# Patient Record
Sex: FEMALE | Race: WHITE | Employment: FULL TIME | URBAN - METROPOLITAN AREA
[De-identification: names, ages, dates, MRNs, and addresses within clinical notes are randomized per-mention and may not be internally consistent; named-entity substitution may affect disease eponyms.]

---

## 2023-03-16 DIAGNOSIS — K21.9 GASTRO-ESOPHAGEAL REFLUX DISEASE WITHOUT ESOPHAGITIS: ICD-10-CM

## 2023-03-16 RX ORDER — PANTOPRAZOLE SODIUM 40 MG/1
TABLET, DELAYED RELEASE ORAL
Qty: 90 TABLET | Refills: 1 | Status: SHIPPED | OUTPATIENT
Start: 2023-03-16 | End: 2023-10-04

## 2023-04-01 PROBLEM — K21.9 GASTROESOPHAGEAL REFLUX DISEASE WITHOUT ESOPHAGITIS: Status: ACTIVE | Noted: 2023-04-01

## 2023-04-01 PROBLEM — K63.5 COLON POLYP: Status: ACTIVE | Noted: 2023-04-01

## 2023-04-01 PROBLEM — I10 PRIMARY HYPERTENSION: Status: ACTIVE | Noted: 2023-04-01

## 2023-04-01 PROBLEM — E78.00 PURE HYPERCHOLESTEROLEMIA: Status: ACTIVE | Noted: 2023-04-01

## 2023-04-01 PROBLEM — E55.9 VITAMIN D DEFICIENCY: Status: ACTIVE | Noted: 2023-04-01

## 2023-04-01 PROBLEM — K76.0 FATTY LIVER: Status: ACTIVE | Noted: 2023-04-01

## 2023-04-01 PROBLEM — C44.91 BCC (BASAL CELL CARCINOMA OF SKIN): Status: ACTIVE | Noted: 2023-04-01

## 2023-04-01 PROBLEM — D64.9 ANEMIA, UNSPECIFIED: Status: ACTIVE | Noted: 2023-04-01

## 2023-04-01 PROBLEM — R51.9 CHRONIC HEADACHE: Status: ACTIVE | Noted: 2023-04-01

## 2023-04-01 PROBLEM — G89.29 CHRONIC HEADACHE: Status: ACTIVE | Noted: 2023-04-01

## 2023-04-03 PROBLEM — K21.9 ACID REFLUX: Status: ACTIVE | Noted: 2023-04-03

## 2023-04-03 PROBLEM — E78.5 DYSLIPIDEMIA: Status: ACTIVE | Noted: 2023-04-03

## 2023-04-03 PROBLEM — K82.4 GALLBLADDER POLYP: Status: ACTIVE | Noted: 2023-04-03

## 2023-04-03 PROBLEM — I10 ESSENTIAL HYPERTENSION: Status: ACTIVE | Noted: 2023-04-03

## 2023-04-03 PROBLEM — E83.52 HYPERCALCEMIA: Status: ACTIVE | Noted: 2023-04-03

## 2023-04-03 PROBLEM — R74.8 ELEVATED LIVER ENZYMES: Status: ACTIVE | Noted: 2023-04-03

## 2023-04-18 ASSESSMENT — ENCOUNTER SYMPTOMS
WHEEZING: 0
SHORTNESS OF BREATH: 0
CONSTITUTIONAL NEGATIVE: 1

## 2023-04-18 NOTE — PROGRESS NOTES
Subjective   Patient ID: Thu Antunez is a 43 y.o. female who presents for Follow-up (Follow-up for Bp readings and medication /Fasting-Yes/Mammo Have not yet/Bp's Yes 127/89 this morning).  Last physical: 10/4/22  Did pt do mammo? no    Is pt fasting? yes  BPs at home-120s/80s in afternoon      HPI  Last labs-10/2022 tsh nl, vit d nl, ldl 142, 2 liver enz high; 4/2022 cbc nl  Due for labs- cmp, lipid    Cholesterol   Date Value Ref Range Status   10/04/2022 256 (H) 0 - 199 mg/dL Final     Comment:     .      AGE      DESIRABLE   BORDERLINE HIGH   HIGH     0-19 Y     0 - 169       170 - 199     >/= 200    20-24 Y     0 - 189       190 - 224     >/= 225         >24 Y     0 - 199       200 - 239     >/= 240   **All ranges are based on fasting samples. Specific   therapeutic targets will vary based on patient-specific   cardiac risk.  .   Pediatric guidelines reference:Pediatrics 2011, 128(S5).   Adult guidelines reference: NCEP ATPIII Guidelines,     RENATA 2001, 258:2486-97  .   Venipuncture immediately after or during the    administration of Metamizole may lead to falsely   low results. Testing should be performed immediately   prior to Metamizole dosing.     04/05/2022 255 (H) 0 - 199 mg/dL Final     Comment:     .      AGE      DESIRABLE   BORDERLINE HIGH   HIGH     0-19 Y     0 - 169       170 - 199     >/= 200    20-24 Y     0 - 189       190 - 224     >/= 225         >24 Y     0 - 199       200 - 239     >/= 240   **All ranges are based on fasting samples. Specific   therapeutic targets will vary based on patient-specific   cardiac risk.  .   Pediatric guidelines reference:Pediatrics 2011, 128(S5).   Adult guidelines reference: NCEP ATPIII Guidelines,     RENATA 2001, 258:2486-97  .   Venipuncture immediately after or during the    administration of Metamizole may lead to falsely   low results. Testing should be performed immediately   prior to Metamizole dosing.     02/18/2020 235 (H) 0 - 199 mg/dL Final      Comment:     .      AGE      DESIRABLE   BORDERLINE HIGH   HIGH     0-19 Y     0 - 169       170 - 199     >/= 200    20-24 Y     0 - 189       190 - 224     >/= 225         >24 Y     0 - 199       200 - 239     >/= 240   **All ranges are based on fasting samples. Specific   therapeutic targets will vary based on patient-specific   cardiac risk.  .   Pediatric guidelines reference:Pediatrics 2011, 128(S5).   Adult guidelines reference: NCEP ATPIII Guidelines,     RENATA 2001, 258:2486-97  .   Venipuncture immediately after or during the    administration of Metamizole may lead to falsely   low results. Testing should be performed immediately   prior to Metamizole dosing.       Triglycerides   Date Value Ref Range Status   10/04/2022 140 0 - 149 mg/dL Final     Comment:     .      AGE      DESIRABLE   BORDERLINE HIGH   HIGH     VERY HIGH   0 D-90 D    19 - 174         ----         ----        ----  91 D- 9 Y     0 -  74        75 -  99     >/= 100      ----    10-19 Y     0 -  89        90 - 129     >/= 130      ----    20-24 Y     0 - 114       115 - 149     >/= 150      ----         >24 Y     0 - 149       150 - 199    200- 499    >/= 500  .   Venipuncture immediately after or during the    administration of Metamizole may lead to falsely   low results. Testing should be performed immediately   prior to Metamizole dosing.     04/05/2022 112 0 - 149 mg/dL Final     Comment:     .      AGE      DESIRABLE   BORDERLINE HIGH   HIGH     VERY HIGH   0 D-90 D    19 - 174         ----         ----        ----  91 D- 9 Y     0 -  74        75 -  99     >/= 100      ----    10-19 Y     0 -  89        90 - 129     >/= 130      ----    20-24 Y     0 - 114       115 - 149     >/= 150      ----         >24 Y     0 - 149       150 - 199    200- 499    >/= 500  .   Venipuncture immediately after or during the    administration of Metamizole may lead to falsely   low results. Testing should be performed immediately   prior to  Metamizole dosing.     02/18/2020 136 0 - 149 mg/dL Final     Comment:     .      AGE      DESIRABLE   BORDERLINE HIGH   HIGH     VERY HIGH   0 D-90 D    19 - 174         ----         ----        ----  91 D- 9 Y     0 -  74        75 -  99     >/= 100      ----    10-19 Y     0 -  89        90 - 129     >/= 130      ----    20-24 Y     0 - 114       115 - 149     >/= 150      ----         >24 Y     0 - 149       150 - 199    200- 499    >/= 500  .   Venipuncture immediately after or during the    administration of Metamizole may lead to falsely   low results. Testing should be performed immediately   prior to Metamizole dosing.       HDL   Date Value Ref Range Status   10/04/2022 86.0 mg/dL Final     Comment:     .      AGE      VERY LOW   LOW     NORMAL    HIGH       0-19 Y       < 35   < 40     40-45     ----    20-24 Y       ----   < 40       >45     ----      >24 Y       ----   < 40     40-60      >60  .     04/05/2022 103.4 mg/dL Final     Comment:     .      AGE      VERY LOW   LOW     NORMAL    HIGH       0-19 Y       < 35   < 40     40-45     ----    20-24 Y       ----   < 40       >45     ----      >24 Y       ----   < 40     40-60      >60  .     02/18/2020 111.1 mg/dL Final     Comment:     .      AGE      VERY LOW   LOW     NORMAL    HIGH       0-19 Y       < 35   < 40     40-45     ----    20-24 Y       ----   < 40       >45     ----      >24 Y       ----   < 40     40-60      >60  .       No results found for: LDL  TSH   Date Value Ref Range Status   10/04/2022 1.63 0.44 - 3.98 mIU/L Final     Comment:      TSH testing is performed using different testing    methodology at [facility] than at other    [facility] hospitals. Direct result comparisons should    only be made within the same method.       No components found for: A1C  No components found for: POCA1C  No results found for: ALBUR  No components found for: POCALBUR    Exercise-back pain; walking and mild yoga 3 times a  wk  Diet-breakfast-scr eggs or grilled ch with quinoa and chickpeas; le croix  Lunch-grilled ch with quinoa and chickpeas or lean cuisine, turkey sandwich, le croix  Dinner-protein, starch, veg, le croix  Snacks string cheese occas  Etoh not daily-socially    Immunization History   Administered Date(s) Administered    Pfizer Purple Cap SARS-CoV-2 04/20/2021, 05/18/2021        No care team member to display     Review of Systems   Constitutional: Negative.    Respiratory:  Negative for shortness of breath and wheezing.    Cardiovascular:  Negative for chest pain.       Objective   Visit Vitals  BP (!) 131/93   Pulse 88   Temp 36.8 °C (98.2 °F) (Oral)      BP Readings from Last 3 Encounters:   04/19/23 (!) 131/93   10/04/22 122/84   04/05/22 118/88     Wt Readings from Last 3 Encounters:   04/19/23 75.5 kg (166 lb 6.4 oz)   10/04/22 72.6 kg (160 lb)   04/05/22 72.5 kg (159 lb 12.8 oz)       The 10-year ASCVD risk score (Debra CHRISTIANSEN, et al., 2019) is: 0.7%    Values used to calculate the score:      Age: 43 years      Sex: Female      Is Non- : No      Diabetic: No      Tobacco smoker: No      Systolic Blood Pressure: 131 mmHg      Is BP treated: Yes      HDL Cholesterol: 86 mg/dL      Total Cholesterol: 256 mg/dL     Physical Exam  Constitutional:       General: She is not in acute distress.     Appearance: Normal appearance.   Neurological:      Mental Status: She is alert.       Assessment/Plan   Diagnoses and all orders for this visit:  Pure hypercholesterolemia  -     Lipid Panel; Future  -     Comprehensive Metabolic Panel; Future  Primary hypertension  -     Comprehensive Metabolic Panel; Future  Encounter for screening mammogram for malignant neoplasm of breast  -     BI mammo bilateral screening tomosynthesis; Future

## 2023-04-19 ENCOUNTER — TELEPHONE (OUTPATIENT)
Dept: PRIMARY CARE | Facility: CLINIC | Age: 43
End: 2023-04-19

## 2023-04-19 ENCOUNTER — OFFICE VISIT (OUTPATIENT)
Dept: PRIMARY CARE | Facility: CLINIC | Age: 43
End: 2023-04-19
Payer: COMMERCIAL

## 2023-04-19 VITALS
SYSTOLIC BLOOD PRESSURE: 130 MMHG | OXYGEN SATURATION: 96 % | BODY MASS INDEX: 30.43 KG/M2 | DIASTOLIC BLOOD PRESSURE: 80 MMHG | HEART RATE: 88 BPM | WEIGHT: 166.4 LBS | TEMPERATURE: 98.2 F

## 2023-04-19 DIAGNOSIS — E78.00 PURE HYPERCHOLESTEROLEMIA: Primary | ICD-10-CM

## 2023-04-19 DIAGNOSIS — Z12.31 ENCOUNTER FOR SCREENING MAMMOGRAM FOR MALIGNANT NEOPLASM OF BREAST: ICD-10-CM

## 2023-04-19 DIAGNOSIS — I10 PRIMARY HYPERTENSION: ICD-10-CM

## 2023-04-19 DIAGNOSIS — R73.9 ELEVATED BLOOD SUGAR: Primary | ICD-10-CM

## 2023-04-19 PROBLEM — E78.5 DYSLIPIDEMIA: Status: RESOLVED | Noted: 2023-04-03 | Resolved: 2023-04-19

## 2023-04-19 LAB
NON-UH HIE A/G RATIO: 1.2
NON-UH HIE ALB: 4.1 G/DL (ref 3.4–5)
NON-UH HIE ALK PHOS: 76 UNIT/L (ref 46–116)
NON-UH HIE BILIRUBIN, TOTAL: 1.2 MG/DL (ref 0.2–1)
NON-UH HIE BUN/CREAT RATIO: 16.7
NON-UH HIE BUN: 15 MG/DL (ref 9–23)
NON-UH HIE CALCIUM: 10.3 MG/DL (ref 8.7–10.4)
NON-UH HIE CALCULATED LDL CHOLESTEROL: 134 MG/DL (ref 60–130)
NON-UH HIE CALCULATED OSMOLALITY: 277 MOSM/KG (ref 275–295)
NON-UH HIE CHLORIDE: 102 MMOL/L (ref 98–107)
NON-UH HIE CHOLESTEROL: 272 MG/DL (ref 100–200)
NON-UH HIE CO2, VENOUS: 28 MMOL/L (ref 20–31)
NON-UH HIE CREATININE: 0.9 MG/DL (ref 0.5–0.8)
NON-UH HIE GFR AA: >60
NON-UH HIE GLOBULIN: 3.3 G/DL
NON-UH HIE GLOMERULAR FILTRATION RATE: >60 ML/MIN/1.73M?
NON-UH HIE GLUCOSE: 103 MG/DL (ref 74–106)
NON-UH HIE GOT: 36 UNIT/L (ref 15–37)
NON-UH HIE GPT: 46 UNIT/L (ref 10–49)
NON-UH HIE HDL CHOLESTEROL: 86 MG/DL (ref 40–60)
NON-UH HIE K: 4.3 MMOL/L (ref 3.5–5.1)
NON-UH HIE NA: 138 MMOL/L (ref 135–145)
NON-UH HIE TOTAL CHOL/HDL CHOL RATIO: 3.2
NON-UH HIE TOTAL PROTEIN: 7.4 G/DL (ref 5.7–8.2)
NON-UH HIE TRIGLYCERIDES: 258 MG/DL (ref 30–150)

## 2023-04-19 PROCEDURE — 99214 OFFICE O/P EST MOD 30 MIN: CPT | Performed by: NURSE PRACTITIONER

## 2023-04-19 PROCEDURE — 1036F TOBACCO NON-USER: CPT | Performed by: NURSE PRACTITIONER

## 2023-04-19 PROCEDURE — 3075F SYST BP GE 130 - 139MM HG: CPT | Performed by: NURSE PRACTITIONER

## 2023-04-19 PROCEDURE — 3080F DIAST BP >= 90 MM HG: CPT | Performed by: NURSE PRACTITIONER

## 2023-04-19 PROCEDURE — 3079F DIAST BP 80-89 MM HG: CPT | Performed by: NURSE PRACTITIONER

## 2023-04-19 RX ORDER — GLUCOSAM/CHONDRO/HERB 149/HYAL 750-100 MG
1 TABLET ORAL 2 TIMES DAILY
Qty: 60 CAPSULE | Refills: 5
Start: 2023-04-19 | End: 2023-05-19

## 2023-04-19 ASSESSMENT — PATIENT HEALTH QUESTIONNAIRE - PHQ9
1. LITTLE INTEREST OR PLEASURE IN DOING THINGS: NOT AT ALL
SUM OF ALL RESPONSES TO PHQ9 QUESTIONS 1 AND 2: 0
2. FEELING DOWN, DEPRESSED OR HOPELESS: NOT AT ALL

## 2023-04-19 NOTE — TELEPHONE ENCOUNTER
----- Message from JEFERSON Jimenez-CNP sent at 4/19/2023  1:53 PM EDT -----  Hdl normal.  Ldl high at 134. It did come down form 142. Goal <100. Decr fats and incr fiber.   Trigs high at 258. This went up from  140. Goal <150. Decr carbs and sugars. Start fish oil 1 twice a day. Recheck in 2mon

## 2023-04-19 NOTE — PATIENT INSTRUCTIONS
Wt loss meds-Wegovy, saxenda, contrave, qsymia  Contrave-$100 a mon at [facility].   Let me know if you want me to send one of these.    Labs fasting    Set up mammo appt    Goal LDL <100 (142)  Goal trigs <150  Goal HDL >50  Exercise-cardio 4-5d/wk 30min each day  Diet-Breakfast-toast (my favorite Yany Mcfadden Delightful multi-grain and Joe's Killer Bread thin-sliced Good Seed)/bagel/English muffin-whole wheat flour as a 1st ingredient or cereal/oatmeal/granola bar-fiber 4g or more; protein like eggs or peanut butter is Ok  Lunch-protein, veg 1c  Dinner-protein, veg 1c; if having potatoes, rice, noodles, or pasta-->fist sized; could try brown rice or whole wheat pasta or quinoa  Fruit 2 a day  Dairy 2 a day-milk, almond milk, soy milk, yogurt, cottage cheese, yogurt  Snacks-Protein-hard boiled egg, nuts (walnuts/almonds/pecans/pistachios 1/4c), hummus, beef/deer jerky; vegetable, fruit, dairy-milk(1%, skim, almond, soy)/cheese (not a lot of cheddar)/yogurt (Greek is best-my favorite Dannon Fruit on the Bottom Greek)/cottage cheese 2%; triscuits, popcorn have a lot of fiber; serving size  Water  Limit alcohol to 2 drinks per day (1 drink=12oz beer or 5oz wine or 1 1/2oz liquor)          Return in oct for wellness      I will communicate with you via [employer] regarding messages and results. If you need help with this, you can call the support line at [phone].    IT WAS A PLEASURE TO SEE YOU TODAY. THANK YOU FOR CHOOSING US FOR YOUR HEALTHCARE NEEDS.

## 2023-07-05 ENCOUNTER — TELEPHONE (OUTPATIENT)
Dept: PRIMARY CARE | Facility: CLINIC | Age: 43
End: 2023-07-05
Payer: COMMERCIAL

## 2023-07-05 DIAGNOSIS — I10 ESSENTIAL (PRIMARY) HYPERTENSION: ICD-10-CM

## 2023-07-05 RX ORDER — TRIAMTERENE/HYDROCHLOROTHIAZID 37.5-25 MG
TABLET ORAL
Qty: 135 TABLET | Refills: 2 | Status: SHIPPED | OUTPATIENT
Start: 2023-07-05 | End: 2024-01-04 | Stop reason: SDUPTHER

## 2023-10-04 DIAGNOSIS — K21.9 GASTRO-ESOPHAGEAL REFLUX DISEASE WITHOUT ESOPHAGITIS: ICD-10-CM

## 2023-10-04 RX ORDER — PANTOPRAZOLE SODIUM 40 MG/1
TABLET, DELAYED RELEASE ORAL
Qty: 90 TABLET | Refills: 0 | Status: SHIPPED | OUTPATIENT
Start: 2023-10-04 | End: 2024-01-03

## 2023-12-24 DIAGNOSIS — I10 ESSENTIAL (PRIMARY) HYPERTENSION: ICD-10-CM

## 2023-12-26 ENCOUNTER — TELEPHONE (OUTPATIENT)
Dept: PRIMARY CARE | Facility: CLINIC | Age: 43
End: 2023-12-26
Payer: COMMERCIAL

## 2023-12-26 RX ORDER — LOSARTAN POTASSIUM 25 MG/1
25 TABLET ORAL DAILY
Qty: 30 TABLET | Refills: 1 | Status: SHIPPED | OUTPATIENT
Start: 2023-12-26 | End: 2024-01-04 | Stop reason: SDUPTHER

## 2024-01-03 DIAGNOSIS — K21.9 GASTRO-ESOPHAGEAL REFLUX DISEASE WITHOUT ESOPHAGITIS: ICD-10-CM

## 2024-01-03 PROBLEM — E83.52 HYPERCALCEMIA: Status: RESOLVED | Noted: 2023-04-03 | Resolved: 2024-01-03

## 2024-01-03 PROBLEM — R74.8 ELEVATED LIVER ENZYMES: Status: RESOLVED | Noted: 2023-04-03 | Resolved: 2024-01-03

## 2024-01-03 RX ORDER — PANTOPRAZOLE SODIUM 40 MG/1
TABLET, DELAYED RELEASE ORAL
Qty: 30 TABLET | Refills: 1 | Status: SHIPPED | OUTPATIENT
Start: 2024-01-03 | End: 2024-01-04 | Stop reason: SDUPTHER

## 2024-01-03 ASSESSMENT — ENCOUNTER SYMPTOMS
COUGH: 0
NECK PAIN: 0
FEVER: 0
DIZZINESS: 0
SORE THROAT: 0
WOUND: 0
HALLUCINATIONS: 0
FREQUENCY: 0
POLYDIPSIA: 0
APPETITE CHANGE: 0
CONFUSION: 0
EYE DISCHARGE: 0
HEMATURIA: 0
UNEXPECTED WEIGHT CHANGE: 0
BLOOD IN STOOL: 0
EYE PAIN: 0
TROUBLE SWALLOWING: 0
EYE REDNESS: 0
POLYPHAGIA: 0
VOMITING: 0
DYSURIA: 0
HEADACHES: 0
SHORTNESS OF BREATH: 0
FATIGUE: 0
ABDOMINAL PAIN: 0
PALPITATIONS: 0
ADENOPATHY: 0
BRUISES/BLEEDS EASILY: 0
CHILLS: 0
BACK PAIN: 0

## 2024-01-03 NOTE — PROGRESS NOTES
Subjective   Patient ID: Thu Antunez is a 43 y.o. female who presents for Follow-up.      Is pt fasting?  No   Does pt see any providers other than care team below:   rah Worthington mahajan     Does pt want flu shot? No   Last mammo- havent had one (order given in April)  Bps at home- hasn't done   Is pt taking fish oil 1 twice a day? No (trigs high in April)  When did pt quit smoking? 15 years   When did she start smoking? Teens   How much did she smoke when she smoked?a pack a week   Any other questions or concerns that pt wants to discuss today? no  Pt is on ozempic -'s    Poc A1c=5.3    No care team member to display    HPI  Last labs-4/2023 Hdl normal.  Ldl high at 134. It did come down form 142. Goal <100. Decr fats and incr fiber.  Trigs high at 258. This went up from  140. Goal <150. Decr carbs and sugars. Start fish oil 1 twice a day. Recheck in 2mon  kidney function, liver function and electrolytes in the CMP (comprehensive metabolic panel) were normal.  Sugar high at 103. Goal <100. Decr carbs and sugars. Last time it was 90.  10/2022 tsh nl, vit d nl, ldl 142, 2 liver enz high; 4/2022 cbc nl   Due for labs- all    Cholesterol   Date Value Ref Range Status   10/04/2022 256 (H) 0 - 199 mg/dL Final     Comment:     .      AGE      DESIRABLE   BORDERLINE HIGH   HIGH     0-19 Y     0 - 169       170 - 199     >/= 200    20-24 Y     0 - 189       190 - 224     >/= 225         >24 Y     0 - 199       200 - 239     >/= 240   **All ranges are based on fasting samples. Specific   therapeutic targets will vary based on patient-specific   cardiac risk.  .   Pediatric guidelines reference:Pediatrics 2011, 128(S5).   Adult guidelines reference: NCEP ATPIII Guidelines,     RENATA 2001, 258:2486-97  .   Venipuncture immediately after or during the    administration of Metamizole may lead to falsely   low results. Testing should be performed immediately   prior to Metamizole dosing.     04/05/2022 255 (H) 0 -  199 mg/dL Final     Comment:     .      AGE      DESIRABLE   BORDERLINE HIGH   HIGH     0-19 Y     0 - 169       170 - 199     >/= 200    20-24 Y     0 - 189       190 - 224     >/= 225         >24 Y     0 - 199       200 - 239     >/= 240   **All ranges are based on fasting samples. Specific   therapeutic targets will vary based on patient-specific   cardiac risk.  .   Pediatric guidelines reference:Pediatrics 2011, 128(S5).   Adult guidelines reference: NCEP ATPIII Guidelines,     RENATA 2001, 258:2486-97  .   Venipuncture immediately after or during the    administration of Metamizole may lead to falsely   low results. Testing should be performed immediately   prior to Metamizole dosing.     02/18/2020 235 (H) 0 - 199 mg/dL Final     Comment:     .      AGE      DESIRABLE   BORDERLINE HIGH   HIGH     0-19 Y     0 - 169       170 - 199     >/= 200    20-24 Y     0 - 189       190 - 224     >/= 225         >24 Y     0 - 199       200 - 239     >/= 240   **All ranges are based on fasting samples. Specific   therapeutic targets will vary based on patient-specific   cardiac risk.  .   Pediatric guidelines reference:Pediatrics 2011, 128(S5).   Adult guidelines reference: NCEP ATPIII Guidelines,     RENATA 2001, 258:2486-97  .   Venipuncture immediately after or during the    administration of Metamizole may lead to falsely   low results. Testing should be performed immediately   prior to Metamizole dosing.       Triglycerides   Date Value Ref Range Status   10/04/2022 140 0 - 149 mg/dL Final     Comment:     .      AGE      DESIRABLE   BORDERLINE HIGH   HIGH     VERY HIGH   0 D-90 D    19 - 174         ----         ----        ----  91 D- 9 Y     0 -  74        75 -  99     >/= 100      ----    10-19 Y     0 -  89        90 - 129     >/= 130      ----    20-24 Y     0 - 114       115 - 149     >/= 150      ----         >24 Y     0 - 149       150 - 199    200- 499    >/= 500  .   Venipuncture immediately after or during  the    administration of Metamizole may lead to falsely   low results. Testing should be performed immediately   prior to Metamizole dosing.     04/05/2022 112 0 - 149 mg/dL Final     Comment:     .      AGE      DESIRABLE   BORDERLINE HIGH   HIGH     VERY HIGH   0 D-90 D    19 - 174         ----         ----        ----  91 D- 9 Y     0 -  74        75 -  99     >/= 100      ----    10-19 Y     0 -  89        90 - 129     >/= 130      ----    20-24 Y     0 - 114       115 - 149     >/= 150      ----         >24 Y     0 - 149       150 - 199    200- 499    >/= 500  .   Venipuncture immediately after or during the    administration of Metamizole may lead to falsely   low results. Testing should be performed immediately   prior to Metamizole dosing.     02/18/2020 136 0 - 149 mg/dL Final     Comment:     .      AGE      DESIRABLE   BORDERLINE HIGH   HIGH     VERY HIGH   0 D-90 D    19 - 174         ----         ----        ----  91 D- 9 Y     0 -  74        75 -  99     >/= 100      ----    10-19 Y     0 -  89        90 - 129     >/= 130      ----    20-24 Y     0 - 114       115 - 149     >/= 150      ----         >24 Y     0 - 149       150 - 199    200- 499    >/= 500  .   Venipuncture immediately after or during the    administration of Metamizole may lead to falsely   low results. Testing should be performed immediately   prior to Metamizole dosing.       HDL   Date Value Ref Range Status   10/04/2022 86.0 mg/dL Final     Comment:     .      AGE      VERY LOW   LOW     NORMAL    HIGH       0-19 Y       < 35   < 40     40-45     ----    20-24 Y       ----   < 40       >45     ----      >24 Y       ----   < 40     40-60      >60  .     04/05/2022 103.4 mg/dL Final     Comment:     .      AGE      VERY LOW   LOW     NORMAL    HIGH       0-19 Y       < 35   < 40     40-45     ----    20-24 Y       ----   < 40       >45     ----      >24 Y       ----   < 40     40-60      >60  .     02/18/2020 111.1 mg/dL Final      "Comment:     .      AGE      VERY LOW   LOW     NORMAL    HIGH       0-19 Y       < 35   < 40     40-45     ----    20-24 Y       ----   < 40       >45     ----      >24 Y       ----   < 40     40-60      >60  .       No results found for: \"LDL\"  TSH   Date Value Ref Range Status   10/04/2022 1.63 0.44 - 3.98 mIU/L Final     Comment:      TSH testing is performed using different testing    methodology at [facility] than at other    [facility] hospitals. Direct result comparisons should    only be made within the same method.       No results found for: \"A1C\"  No components found for: \"POCA1C\"  No results found for: \"ALBUR\"  No components found for: \"POCALBUR\"      Other concerns: 6-8mon ago face broke out; took 's clindamycin-resolved  Thought it was coming back a couple mon ago; restarted clindamycin-side effect-prednisone and benadryl helped    ER/urgicare visits in the last year- none  Hospitalizations in the last year- none      last Pap-hyster    FH ovarian, endometrial, cervical, uterine ca-none    last mammo- (40-75/90)-due  Self breast exams-yes    FH br ca-none      FH colon ca-none      Exercise- walking, starting yoga  Diet-2 meals a day   Body mass index is 30.4 kg/m².    lasik    last dental appt- due    BMs-regular  Sleep-able to fall asleep and stay asleep; no snoring or apnea  no cp, swelling, sob, abd pain, n/v/d/c, blood in stool or black stools  STI testing including hiv (age 15-65) and hep c screening (18-79)-declinesnone        Immunization History   Administered Date(s) Administered    Pfizer Purple Cap SARS-CoV-2 04/20/2021, 05/18/2021           fractures in lifetime-none  Anyone with osteoporosis in the family-mom-on the verge    FH heart attack, heart surgery-dad-mi, mgf  FH stroke-none    The 10-year ASCVD risk score (Debra CHRISTIANSEN, et al., 2019) is: 0.7%    Values used to calculate the score:      Age: 43 years      Sex: Female      Is Non- : No      " Diabetic: No      Tobacco smoker: No      Systolic Blood Pressure: 126 mmHg      Is BP treated: Yes      HDL Cholesterol: 86 mg/dL      Total Cholesterol: 256 mg/dL  Coronary Artery Calcium score:  This test is recommended for men 45 or older and women 55 or older without a history of heart disease and have 1 risk factor (high LDL cholesterol, low HDL cholesterol, high blood pressure, smoker (current or past), type 2 diabetes, IBD, lupus, RA, ankylosing spondylitis, psoriasis or family history of  heart disease <55yrs in dad, brother or child or <65yrs in mom, sister, or child.)       Review of Systems   Constitutional:  Negative for appetite change, chills, fatigue, fever and unexpected weight change.   HENT:  Negative for congestion, ear pain, sore throat and trouble swallowing.    Eyes:  Negative for pain, discharge and redness.   Respiratory:  Negative for cough and shortness of breath.    Cardiovascular:  Negative for chest pain and palpitations.   Gastrointestinal:  Negative for abdominal pain, blood in stool and vomiting.   Endocrine: Negative for polydipsia, polyphagia and polyuria.   Genitourinary:  Negative for dysuria, frequency, hematuria and urgency.   Musculoskeletal:  Negative for back pain and neck pain.   Skin:  Negative for rash and wound.   Allergic/Immunologic: Negative for immunocompromised state.   Neurological:  Negative for dizziness, syncope and headaches.   Hematological:  Negative for adenopathy. Does not bruise/bleed easily.   Psychiatric/Behavioral:  Negative for confusion and hallucinations.        Objective   Visit Vitals  /88   Pulse 102   Temp 36.4 °C (97.5 °F)      BP Readings from Last 3 Encounters:   01/04/24 126/88   04/19/23 130/80   10/04/22 122/84     Wt Readings from Last 3 Encounters:   01/04/24 75.4 kg (166 lb 3.2 oz)   04/19/23 75.5 kg (166 lb 6.4 oz)   10/04/22 72.6 kg (160 lb)           Physical Exam  Constitutional:       General: She is not in acute distress.      Appearance: Normal appearance. She is not ill-appearing.   HENT:      Head: Normocephalic.      Right Ear: Tympanic membrane, ear canal and external ear normal.      Left Ear: Tympanic membrane, ear canal and external ear normal.      Nose: Nose normal.      Mouth/Throat:      Mouth: Mucous membranes are moist.      Pharynx: Oropharynx is clear.   Eyes:      Extraocular Movements: Extraocular movements intact.      Conjunctiva/sclera: Conjunctivae normal.      Pupils: Pupils are equal, round, and reactive to light.   Cardiovascular:      Rate and Rhythm: Normal rate and regular rhythm.      Heart sounds: Normal heart sounds. No murmur heard.  Pulmonary:      Effort: Pulmonary effort is normal. No respiratory distress.      Breath sounds: Normal breath sounds. No wheezing, rhonchi or rales.   Abdominal:      General: Bowel sounds are normal.      Palpations: Abdomen is soft. There is no mass.      Tenderness: There is no abdominal tenderness.   Musculoskeletal:         General: No swelling or tenderness. Normal range of motion.      Cervical back: Normal range of motion and neck supple.      Right lower leg: No edema.      Left lower leg: No edema.   Skin:     General: Skin is warm.      Findings: No rash.   Neurological:      General: No focal deficit present.      Mental Status: She is alert and oriented to person, place, and time.      Cranial Nerves: No cranial nerve deficit.      Motor: No weakness.   Psychiatric:         Mood and Affect: Mood normal.         Behavior: Behavior normal.         Assessment/Plan   Diagnoses and all orders for this visit:  Routine general medical examination at a health care facility  -     Comprehensive Metabolic Panel; Future  -     CBC and Auto Differential; Future  -     Lipid Panel; Future  -     TSH with reflex to Free T4 if abnormal; Future  Elevated blood sugar  -     POCT glycosylated hemoglobin (Hb A1C) manually resulted  -     CT cardiac scoring wo IV contrast;  Future  Gastro-esophageal reflux disease without esophagitis  -     pantoprazole (ProtoNix) 40 mg EC tablet; TAKE 1 TAB BY MOUTH DAILY ON AN EMPTY STOMACH 30-60 MINS BEFORE BREAKFAST,DUE FOR PHYSICAL  Essential (primary) hypertension  -     losartan (Cozaar) 25 mg tablet; Take 1 tablet (25 mg) by mouth once daily.  -     triamterene-hydrochlorothiazid (Maxzide-25) 37.5-25 mg tablet; Take 1.5 tablets by mouth once daily.  -     CT cardiac scoring wo IV contrast; Future  BMI 30.0-30.9,adult  Encounter for screening mammogram for malignant neoplasm of breast  -     BI mammo bilateral screening tomosynthesis; Future  Other orders  -     Follow Up In Primary Care - Health Maintenance; Future

## 2024-01-04 ENCOUNTER — HOSPITAL ENCOUNTER (OUTPATIENT)
Dept: RADIOLOGY | Facility: EXTERNAL LOCATION | Age: 44
Discharge: HOME | End: 2024-01-04

## 2024-01-04 ENCOUNTER — OFFICE VISIT (OUTPATIENT)
Dept: PRIMARY CARE | Facility: CLINIC | Age: 44
End: 2024-01-04
Payer: COMMERCIAL

## 2024-01-04 VITALS
DIASTOLIC BLOOD PRESSURE: 88 MMHG | BODY MASS INDEX: 30.59 KG/M2 | OXYGEN SATURATION: 97 % | WEIGHT: 166.2 LBS | SYSTOLIC BLOOD PRESSURE: 126 MMHG | TEMPERATURE: 97.5 F | HEIGHT: 62 IN | HEART RATE: 102 BPM

## 2024-01-04 DIAGNOSIS — Z12.31 ENCOUNTER FOR SCREENING MAMMOGRAM FOR MALIGNANT NEOPLASM OF BREAST: ICD-10-CM

## 2024-01-04 DIAGNOSIS — I10 ESSENTIAL (PRIMARY) HYPERTENSION: ICD-10-CM

## 2024-01-04 DIAGNOSIS — K21.9 GASTRO-ESOPHAGEAL REFLUX DISEASE WITHOUT ESOPHAGITIS: ICD-10-CM

## 2024-01-04 DIAGNOSIS — Z00.00 ROUTINE GENERAL MEDICAL EXAMINATION AT A HEALTH CARE FACILITY: Primary | ICD-10-CM

## 2024-01-04 DIAGNOSIS — R73.9 ELEVATED BLOOD SUGAR: ICD-10-CM

## 2024-01-04 LAB — POC HEMOGLOBIN A1C: 5.3 % (ref 4.2–6.5)

## 2024-01-04 PROCEDURE — 99396 PREV VISIT EST AGE 40-64: CPT | Performed by: NURSE PRACTITIONER

## 2024-01-04 PROCEDURE — 3074F SYST BP LT 130 MM HG: CPT | Performed by: NURSE PRACTITIONER

## 2024-01-04 PROCEDURE — 3079F DIAST BP 80-89 MM HG: CPT | Performed by: NURSE PRACTITIONER

## 2024-01-04 PROCEDURE — 1036F TOBACCO NON-USER: CPT | Performed by: NURSE PRACTITIONER

## 2024-01-04 PROCEDURE — 3008F BODY MASS INDEX DOCD: CPT | Performed by: NURSE PRACTITIONER

## 2024-01-04 PROCEDURE — 83036 HEMOGLOBIN GLYCOSYLATED A1C: CPT | Performed by: NURSE PRACTITIONER

## 2024-01-04 RX ORDER — SEMAGLUTIDE 1.34 MG/ML
0.25 INJECTION, SOLUTION SUBCUTANEOUS
COMMUNITY

## 2024-01-04 RX ORDER — TRIAMTERENE/HYDROCHLOROTHIAZID 37.5-25 MG
1.5 TABLET ORAL DAILY
Qty: 135 TABLET | Refills: 4 | Status: SHIPPED | OUTPATIENT
Start: 2024-01-04

## 2024-01-04 RX ORDER — LOSARTAN POTASSIUM 25 MG/1
25 TABLET ORAL DAILY
Qty: 90 TABLET | Refills: 4 | Status: SHIPPED | OUTPATIENT
Start: 2024-01-04

## 2024-01-04 RX ORDER — PANTOPRAZOLE SODIUM 40 MG/1
TABLET, DELAYED RELEASE ORAL
Qty: 90 TABLET | Refills: 4 | Status: SHIPPED | OUTPATIENT
Start: 2024-01-04

## 2024-01-04 NOTE — PATIENT INSTRUCTIONS
Fish oil 1 twice a day  Labs in 2mon    See dentist    A1C today=5.3  This is the 3 month average of your sugars.  You are in the normal range which is 5.6 or less.    Meds refilled. The number of refills on the meds match when you need to return to the office for an appt. I recommend making your next appt today so you don't run out of your medication as it may take me up to 3 days to refill it.    Handouts given to pt:  physical handout        I recommend signing up for [employer].    Labs:    You can use the lab in our building when fasting. The hrs are: Monday-Friday, 7 a.m. - 5 p.m., Saturday 8 a.m. - 12 noon.   No appt needed, BUT YOU DO NEED THE PAPER ORDER.    Fasting is no food, drink, gum or mints other than water for 12 hrs.   Results will be back in 2-3 business days for most labs. It is always recommended for any orders (labs, xrays, ultrasounds,MRI, ct scan, procedures etc) to check with your insurance provider for expected costs or expenses to you.     Screenings:    To set up mammogram, call [phone]    You will get your results via phone from my medical assistant if you do not have MyChart.  OR  You will get your results via [employer]    If a result is urgent, I will call to speak to you.    Vaccines:  ---- flu vaccine-declines      General recommendations:  Exercise-cardio 4-5d/wk 30min each day  Diet-Breakfast-toast (my favorite Yany Mcfadden Delighful Multigrain or Joe's Killer Bread Good Seed thin-sliced)/bagel/English muffin-whole wheat flour as a 1st ingredient or cereal/oatmeal/granola bar-fiber 4g or more or protein like eggs or peanut butter; optional veggies  Lunch-protein, 1/2c carb or 2 slices bread, veg 1c  Dinner-protein, fist sized carb, veg 1c  Fruit 2 a day  Dairy 2 a day-milk, soy milk, almond milk, cheese, yogurt, cottage cheese  Snacks-Protein-hard boiled egg, nuts (walnuts/almonds/pecans/pistachios 1/4c), hummus, beef/deer jerky or meat sticks; vegetable, fruit, dairy-milk(1%, skim,  almond, soy)/cheese (not a lot of cheddar)/yogurt (Greek is best-my favorite Dannon Fruit on the Bottom Greek)/cottage cheese 2%; triscuits/ popcorn/wheat thins have a lot of fiber; follow serving size on bag/box/container  increase water  Limit alcohol to 1 drink per day for women and 2 drinks per day for men (1 drink=12oz beer or 5oz wine or 1 1/2oz liquor)  Calcium: 500mg 1 twice a day if age 50 and younger and 600mg 1 twice a day if over age 50 (calcium citrate can be taken without food)  Vitamin D: 800-5000 IU/day  Limit salt to <2300mg a day if age 50 and under and <1500mg a day if over age 50/have high bp or diabetes or kidney disease  Recommend folate for childbearing age women 0.4mg per day (can be found in a multivitamin)  Recommend 18mg/dL of iron a day if age 50 and under and 8mg/dL a day if over age 50; take on an empty stomach at bedtime  Use sunscreen   Wear seatbelt  Recommend safe sex practices: using condoms everytime you have sex, discuss with a new partner about their past partners/history of STDs/drug use, avoid drinking alcohol or using drugs as this increases the chance that you will participate in high-risk sex, for oral sex help protect your mouth by having your partner use a condom (male or female), women should not douche after sex, be aware of your partner's body and your body-look for signs of a sore, blister, rash, or discharge, and have regular exams and periodic tests for STDs.  No distracted driving  No driving when under influence of substances  Wear a seatbelt  Eye dr every 1-2yrs  Dentist every 6-12 mon  Tetanus shot every 10yrs  Recommend flu vaccine in the fall  Appt in  1 year for physical      I will communicate with you via [employer] regarding messages and results. If you need help with this, you can call the support line at [phone].    IT WAS A PLEASURE TO SEE YOU TODAY. THANK YOU FOR CHOOSING US FOR YOUR HEALTHCARE NEEDS.

## 2024-07-11 ENCOUNTER — APPOINTMENT (OUTPATIENT)
Dept: PRIMARY CARE | Facility: CLINIC | Age: 44
End: 2024-07-11
Payer: COMMERCIAL

## 2024-07-25 LAB
NON-UH HIE A/G RATIO: 1.3
NON-UH HIE ALB: 4.1 G/DL (ref 3.4–5)
NON-UH HIE ALK PHOS: 76 UNIT/L (ref 45–117)
NON-UH HIE BASO COUNT: 0.02 X1000 (ref 0–0.2)
NON-UH HIE BASOS %: 0.4 %
NON-UH HIE BILIRUBIN, TOTAL: 0.9 MG/DL (ref 0.3–1.2)
NON-UH HIE BUN/CREAT RATIO: 17.8
NON-UH HIE BUN: 16 MG/DL (ref 9–23)
NON-UH HIE CALCIUM: 10.1 MG/DL (ref 8.7–10.4)
NON-UH HIE CALCULATED LDL CHOLESTEROL: 138 MG/DL (ref 60–130)
NON-UH HIE CALCULATED OSMOLALITY: 281 MOSM/KG (ref 275–295)
NON-UH HIE CHLORIDE: 105 MMOL/L (ref 98–107)
NON-UH HIE CHOLESTEROL: 241 MG/DL (ref 100–200)
NON-UH HIE CO2, VENOUS: 31 MMOL/L (ref 20–31)
NON-UH HIE CREATININE: 0.9 MG/DL (ref 0.5–0.8)
NON-UH HIE DIFF?: NO
NON-UH HIE EOS COUNT: 0.08 X1000 (ref 0–0.5)
NON-UH HIE EOSIN %: 1.4 %
NON-UH HIE GFR AA: >60
NON-UH HIE GLOBULIN: 3.1 G/DL
NON-UH HIE GLOMERULAR FILTRATION RATE: >60 ML/MIN/1.73M?
NON-UH HIE GLUCOSE: 99 MG/DL (ref 74–106)
NON-UH HIE GOT: 33 UNIT/L (ref 15–37)
NON-UH HIE GPT: 41 UNIT/L (ref 10–49)
NON-UH HIE HCT: 43 % (ref 36–46)
NON-UH HIE HDL CHOLESTEROL: 86 MG/DL (ref 40–60)
NON-UH HIE HGB: 14.6 G/DL (ref 12–16)
NON-UH HIE INSTR WBC: 6
NON-UH HIE K: 5.1 MMOL/L (ref 3.5–5.1)
NON-UH HIE LYMPH %: 35.6 %
NON-UH HIE LYMPH COUNT: 2.12 X1000 (ref 1.2–4.8)
NON-UH HIE MCH: 32.8 PG (ref 27–34)
NON-UH HIE MCHC: 33.9 G/DL (ref 32–37)
NON-UH HIE MCV: 96.6 FL (ref 80–100)
NON-UH HIE MONO %: 9.2 %
NON-UH HIE MONO COUNT: 0.55 X1000 (ref 0.1–1)
NON-UH HIE MPV: 7.8 FL (ref 7.4–10.4)
NON-UH HIE NA: 140 MMOL/L (ref 135–145)
NON-UH HIE NEUTROPHIL %: 53.4 %
NON-UH HIE NEUTROPHIL COUNT (ANC): 3.18 X1000 (ref 1.4–8.8)
NON-UH HIE NUCLEATED RBC: 0 /100WBC
NON-UH HIE PLATELET: 315 X10 (ref 150–450)
NON-UH HIE RBC: 4.45 X10 (ref 4.2–5.4)
NON-UH HIE RDW: 13.3 % (ref 11.5–14.5)
NON-UH HIE TOTAL CHOL/HDL CHOL RATIO: 2.8
NON-UH HIE TOTAL PROTEIN: 7.2 G/DL (ref 5.7–8.2)
NON-UH HIE TRIGLYCERIDES: 86 MG/DL (ref 30–150)
NON-UH HIE TSH: 1.02 UIU/ML (ref 0.55–4.78)
NON-UH HIE WBC: 6 X10 (ref 4.5–11)

## 2024-07-31 ENCOUNTER — PATIENT MESSAGE (OUTPATIENT)
Dept: PRIMARY CARE | Facility: CLINIC | Age: 44
End: 2024-07-31
Payer: COMMERCIAL

## 2024-07-31 DIAGNOSIS — R73.9 ELEVATED BLOOD SUGAR: Primary | ICD-10-CM

## 2024-07-31 RX ORDER — SEMAGLUTIDE 1.34 MG/ML
0.25 INJECTION, SOLUTION SUBCUTANEOUS
Qty: 4.5 ML | Refills: 4 | Status: SHIPPED | OUTPATIENT
Start: 2024-08-04

## 2024-10-21 DIAGNOSIS — R73.9 ELEVATED BLOOD SUGAR: Primary | ICD-10-CM

## 2024-11-22 DIAGNOSIS — R73.9 ELEVATED BLOOD SUGAR: Primary | ICD-10-CM

## 2024-12-26 ASSESSMENT — ENCOUNTER SYMPTOMS
WHEEZING: 0
SHORTNESS OF BREATH: 0
CONSTITUTIONAL NEGATIVE: 1

## 2024-12-26 NOTE — PROGRESS NOTES
Physical Therapy Daily Treatment      Visit Count: 5   Authorization Status: No Limit  Next Referring Provider Visit: 4/12/17    Initial Evaluation Date: 03/06/17  Referred by: Dr. Josué Branch MD  Medical Diagnosis (from order):  M25.511 Right shoulder pain, unspecified chronicity   Primary Insurance: [employer]  Secondary Insurance: N/A    Date of Surgery: 3/3/17; surgery performed: Right shoulder arthroscopy with subacromial decompression, acromioplasty, excision of calcific tendinitis; rehabilitation guidelines: yes  Diagnosis Precautions: none  Relevant co-morbidities and medications: None    Relevant Tests: None     SUBJECTIVE   Patient reports that he still has popping in his shoulder, but it is not painful. He had one twinge of sharp pain yesterday when trying to open a heavy door that had a \"L\" handle.     Current Pain: 2/10    Functional Change: None reported    OBJECTIVE   Observation: almost full AROM into all planes today    Treatment   Therapeutic Exercise:  UBE level 1 2.5:2.5   Low trap pull down with red TB 10X  Rows with red TB 10X   Standing IR with yellow TB and towel 10X on R  Standing ER with yellow TB and towel 10X on R  Standing flexion to ~110 degrees with yellow TB 10X on R  Standing abduction to ~110 degrees with yellow TB 10X on R  Table walk back 5X   Unilateral doorway pectoral stretch 30 seconds on R  Sleeper stretch 30 seconds on R    Manual Therapy:   TER into IR 30 seconds on R  Contract-Relax stretch for IR on R 10-5-10-5-10     Current Home Program (not performed this date except as noted above):   3/6/17: AP and ML pendulums, scapular retractions in pendulum position, pulleys, shoulder shrug with scapular retraction, elbow flexion/extension, AAROM ER   3/21/17: prone Y lift scapular retraction and prone T thumb up lift scapular retraction 2X 5 twice a day  3/23/17: unilateral pectoral stretch, table walk backs for shoulder flexion, and sleeper stretch     ASSESSMENT  Subjective   Patient ID: Thu Antunez is a 44 y.o. female who presents for Pain.  Last physical: 1/4/24; has appt scheduled in jan for cpe    Does pt want flu shot? no  Does pt want a tetanus shot? no  When did the pain start? Sunday   Where is the pain located? Middle of diaphragm to belly button   Any other questions or concerns that pt wants to discuss today? No         HPI  abd pain x 5d; epigastric to umbilicus  Happens once a month for 2d and then goes away  Stones or cyst in gallbladder  Saw dr mark    4/10 pain now  7/10 pain at worst the last 5d  Gas, bloating, belching noted  Sl burning in middle of chest  Denies reflux    No diarrhea, constipation, nausea, vomiting, fever, chills, wt loss, yellow skin, choking or swallowing difficulty  last stool-this am  no blood in stool  last colonoscopy-8/2022; due 8/2027  last egd-long ago  has seen a GI dr-yes  loss of appetite-yes but feels bloated with eating  fluids-yes  has urinated 3+ times in the last 24hrs  no dry mouth  no new meds or otc meds  no diet changes  no recent travel  no recent abx  no new exercise routine  no fall or injury  no dysuria, frequency, change in stream or flow, urgency, hematuria, lbp, change in vaginal d/c  no cough, sob, wheezing, tight upper chest, hoarseness, runny nose, cp, heart racing, skips/pauses, ST, rash, HA, dizziness  selftxt-took 's perc and it helped, gas x  Not like to take ibu because causes upset stomach  no one around pt with similar sx    Taking pantoprazole daily consistently    I have personally reviewed the OARRS report for this patient. I have considered the risks of abuse, dependence, addiction and diversion. No concerns.        No care team member to display     Review of Systems   Constitutional: Negative.  Negative for chills and fever.   Respiratory:  Negative for shortness of breath and wheezing.    Cardiovascular:  Negative for chest pain.   Gastrointestinal:  Positive for abdominal pain.    Edwin did well today with the new addition of shoulder strengthening.  He didn't have any pain with new strengthening and enjoyed the new stretches that were added today.     Pain after treatment: not assessed/10    Result of above outlined education: Verbalizes understanding, Demonstrates understanding and Needs reinforcement    Goals:       To be obtained by end of this plan of care:  1. Patient independent with modified and progressed home exercise program.  2. Patient will increase involved shoulder active range of motion to abduction 170°, flexion 170°, internal rotation 70°, external rotation 80° to aid in normalization of upper extremity movements to aid activities of independent daily living.   3. Patient will increase involved shoulder strength to 5/5 to aid in completion of self care tasks, completion of household tasks for independent living.  4. Patient will be able to reach behind back with minimal pain/difficulty to improve function in dressing.   5. Patient will be able to reach over head with minimal pain/difficulty to improve function in cooking, reaching into cupboard, grooming.  6. Patient/Client will be able to lift up to 50# pounds from waist to eye level with proper body mechanics to assist with lifting overhead activities at work  7. Patient/Client will be able to lift up to 50# pounds overhead with proper body mechanics to assist with lifting overhead activities at work.  8. DASH: Patient will complete form to reflect an improved score from initial score of 75.83 to less than or equal to 50 (scored 0-100; a higher score indicates greater disability) to indicate pt reported improvement in function/disability/impairment (minimal detectable change: 15 points).     PLAN   NEXT SESSION: continue with low level shoulder and scapular strengthening, IR stretching     THERAPY DAILY BILLING   Primary Insurance: [facility]  Secondary Insurance: N/A    Evaluation Procedures:  No evaluation  Negative for blood in stool, constipation, diarrhea, nausea and vomiting.   Genitourinary:  Negative for dysuria, frequency, hematuria and urgency.       Objective   Visit Vitals  /83   Pulse 84   Temp 36.4 °C (97.5 °F)      BP Readings from Last 3 Encounters:   12/27/24 121/83   01/04/24 126/88   04/19/23 130/80     Wt Readings from Last 3 Encounters:   12/27/24 68 kg (150 lb)   01/04/24 75.4 kg (166 lb 3.2 oz)   04/19/23 75.5 kg (166 lb 6.4 oz)       Physical Exam  Constitutional:       General: She is not in acute distress.     Appearance: Normal appearance.   HENT:      Head: Normocephalic.      Right Ear: Tympanic membrane, ear canal and external ear normal.      Left Ear: Tympanic membrane, ear canal and external ear normal.      Nose: Nose normal.      Mouth/Throat:      Mouth: Mucous membranes are moist.      Pharynx: No oropharyngeal exudate or posterior oropharyngeal erythema.   Cardiovascular:      Rate and Rhythm: Normal rate and regular rhythm.      Heart sounds: Normal heart sounds.   Pulmonary:      Effort: Pulmonary effort is normal.      Breath sounds: Normal breath sounds. No wheezing, rhonchi or rales.   Abdominal:      General: Abdomen is flat.      Palpations: Abdomen is soft. There is no mass.      Tenderness: There is abdominal tenderness (epigastric pain). There is no right CVA tenderness, left CVA tenderness, guarding or rebound.      Hernia: No hernia is present.   Lymphadenopathy:      Cervical: No cervical adenopathy.   Neurological:      Mental Status: She is alert.       Assessment/Plan   Diagnoses and all orders for this visit:  Upper abdominal pain  -     dicyclomine (Bentyl) 20 mg tablet; Take 1 tablet (20 mg) by mouth 4 times a day as needed (abdominal pain or cramps).  -     sucralfate (Carafate) 1 gram tablet; Take 1 tablet (1 g) by mouth 4 times a day before meals.  -     oxyCODONE-acetaminophen (Percocet) 5-325 mg tablet; Take 1 tablet by mouth every 6 hours if needed for  codes were used on this date of service    Timed Procedures:  Manual Therapy, 10 minutes  Therapeutic Exercise, 30 minutes    Untimed Procedures:  No untimed codes were used on this date of service    Total Treatment Time: 40 minutes    Physician Signature on file.   severe pain (7 - 10) for up to 6 days.  -     Comprehensive Metabolic Panel; Future  -     CBC and Auto Differential; Future  -     Amylase; Future  -     Lipase; Future  -     US biliary system; Future        See patient instructions for full plan

## 2024-12-26 NOTE — PATIENT INSTRUCTIONS
dicyclomine-for abdominal spasms  Carafate to coat the stomach  Continue pantoprazole   Percocet to use for pain as needed; watch for constipation  labs-cbc, cmp, amylase, lipase  ultrasound gallbladder  See dr mark:  [address] [facility] [id number], [place] [address]  Phone: [phone]  OR  Dr Taylor if desire    To ER if sx worsen    Keep terrell appt    I will communicate with you via [employer] regarding messages and results. If you need help with this, you can call the support line at [phone].    IT WAS A PLEASURE TO SEE YOU TODAY. THANK YOU FOR CHOOSING US FOR YOUR HEALTHCARE NEEDS.

## 2024-12-27 ENCOUNTER — HOSPITAL ENCOUNTER (OUTPATIENT)
Dept: RADIOLOGY | Facility: EXTERNAL LOCATION | Age: 44
Discharge: HOME | End: 2024-12-27

## 2024-12-27 ENCOUNTER — OFFICE VISIT (OUTPATIENT)
Dept: PRIMARY CARE | Facility: CLINIC | Age: 44
End: 2024-12-27
Payer: COMMERCIAL

## 2024-12-27 VITALS
TEMPERATURE: 97.5 F | DIASTOLIC BLOOD PRESSURE: 83 MMHG | BODY MASS INDEX: 27.6 KG/M2 | HEIGHT: 62 IN | WEIGHT: 150 LBS | OXYGEN SATURATION: 97 % | SYSTOLIC BLOOD PRESSURE: 121 MMHG | HEART RATE: 84 BPM

## 2024-12-27 DIAGNOSIS — R10.10 UPPER ABDOMINAL PAIN: Primary | ICD-10-CM

## 2024-12-27 DIAGNOSIS — R74.8 ELEVATED LIPASE: Primary | ICD-10-CM

## 2024-12-27 DIAGNOSIS — R10.10 UPPER ABDOMINAL PAIN: ICD-10-CM

## 2024-12-27 LAB
NON-UH HIE A/G RATIO: 1.3
NON-UH HIE ALB: 3.9 G/DL (ref 3.4–5)
NON-UH HIE ALK PHOS: 63 UNIT/L (ref 45–117)
NON-UH HIE AMYLASE: 87 UNIT/L (ref 30–118)
NON-UH HIE BASO COUNT: 0.03 X1000 (ref 0–0.2)
NON-UH HIE BASOS %: 0.4 %
NON-UH HIE BILIRUBIN, TOTAL: 0.7 MG/DL (ref 0.3–1.2)
NON-UH HIE BUN/CREAT RATIO: 20
NON-UH HIE BUN: 14 MG/DL (ref 9–23)
NON-UH HIE CALCIUM: 9.8 MG/DL (ref 8.7–10.4)
NON-UH HIE CALCULATED OSMOLALITY: 281 MOSM/KG (ref 275–295)
NON-UH HIE CHLORIDE: 104 MMOL/L (ref 98–107)
NON-UH HIE CO2, VENOUS: 29 MMOL/L (ref 20–31)
NON-UH HIE CREATININE: 0.7 MG/DL (ref 0.5–0.8)
NON-UH HIE DIFF?: NO
NON-UH HIE EOS COUNT: 0.1 X1000 (ref 0–0.5)
NON-UH HIE EOSIN %: 1.5 %
NON-UH HIE GFR AA: >60
NON-UH HIE GLOBULIN: 2.9 G/DL
NON-UH HIE GLOMERULAR FILTRATION RATE: >60 ML/MIN/1.73M?
NON-UH HIE GLUCOSE: 115 MG/DL (ref 74–106)
NON-UH HIE GOT: 46 UNIT/L (ref 15–37)
NON-UH HIE GPT: 58 UNIT/L (ref 10–49)
NON-UH HIE HCT: 42.1 % (ref 36–46)
NON-UH HIE HGB: 14.5 G/DL (ref 12–16)
NON-UH HIE INSTR WBC: 6.4
NON-UH HIE K: 4.1 MMOL/L (ref 3.5–5.1)
NON-UH HIE LIPASE: 125 UNIT/L (ref 12–53)
NON-UH HIE LYMPH %: 24.2 %
NON-UH HIE LYMPH COUNT: 1.55 X1000 (ref 1.2–4.8)
NON-UH HIE MCH: 33.4 PG (ref 27–34)
NON-UH HIE MCHC: 34.5 G/DL (ref 32–37)
NON-UH HIE MCV: 96.7 FL (ref 80–100)
NON-UH HIE MONO %: 8.8 %
NON-UH HIE MONO COUNT: 0.56 X1000 (ref 0.1–1)
NON-UH HIE MPV: 7.7 FL (ref 7.4–10.4)
NON-UH HIE NA: 140 MMOL/L (ref 135–145)
NON-UH HIE NEUTROPHIL %: 65.2 %
NON-UH HIE NEUTROPHIL COUNT (ANC): 4.18 X1000 (ref 1.4–8.8)
NON-UH HIE NUCLEATED RBC: 0 /100WBC
NON-UH HIE PLATELET: 275 X10 (ref 150–450)
NON-UH HIE RBC: 4.35 X10 (ref 4.2–5.4)
NON-UH HIE RDW: 13 % (ref 11.5–14.5)
NON-UH HIE TOTAL PROTEIN: 6.8 G/DL (ref 5.7–8.2)
NON-UH HIE WBC: 6.4 X10 (ref 4.5–11)

## 2024-12-27 PROCEDURE — 3079F DIAST BP 80-89 MM HG: CPT | Performed by: NURSE PRACTITIONER

## 2024-12-27 PROCEDURE — 3074F SYST BP LT 130 MM HG: CPT | Performed by: NURSE PRACTITIONER

## 2024-12-27 PROCEDURE — 99214 OFFICE O/P EST MOD 30 MIN: CPT | Performed by: NURSE PRACTITIONER

## 2024-12-27 PROCEDURE — 1036F TOBACCO NON-USER: CPT | Performed by: NURSE PRACTITIONER

## 2024-12-27 PROCEDURE — 3008F BODY MASS INDEX DOCD: CPT | Performed by: NURSE PRACTITIONER

## 2024-12-27 RX ORDER — OXYCODONE AND ACETAMINOPHEN 5; 325 MG/1; MG/1
1 TABLET ORAL EVERY 6 HOURS PRN
Qty: 24 TABLET | Refills: 0 | Status: SHIPPED | OUTPATIENT
Start: 2024-12-27 | End: 2025-01-02

## 2024-12-27 RX ORDER — SUCRALFATE 1 G/1
1 TABLET ORAL
Qty: 120 TABLET | Refills: 1 | Status: SHIPPED | OUTPATIENT
Start: 2024-12-27 | End: 2025-12-27

## 2024-12-27 RX ORDER — DICYCLOMINE HYDROCHLORIDE 20 MG/1
20 TABLET ORAL 4 TIMES DAILY PRN
Qty: 120 TABLET | Refills: 1 | Status: SHIPPED | OUTPATIENT
Start: 2024-12-27 | End: 2025-02-25

## 2024-12-27 ASSESSMENT — ENCOUNTER SYMPTOMS
BLOOD IN STOOL: 0
NAUSEA: 0
CHILLS: 0
DIARRHEA: 0
CONSTIPATION: 0
FEVER: 0
HEMATURIA: 0
VOMITING: 0
DYSURIA: 0
ABDOMINAL PAIN: 1
FREQUENCY: 0

## 2025-01-09 ENCOUNTER — APPOINTMENT (OUTPATIENT)
Dept: PRIMARY CARE | Facility: CLINIC | Age: 45
End: 2025-01-09
Payer: COMMERCIAL

## 2025-01-09 ENCOUNTER — HOSPITAL ENCOUNTER (OUTPATIENT)
Dept: RADIOLOGY | Facility: EXTERNAL LOCATION | Age: 45
Discharge: HOME | End: 2025-01-09

## 2025-01-09 VITALS
WEIGHT: 151.4 LBS | HEIGHT: 62 IN | BODY MASS INDEX: 27.86 KG/M2 | DIASTOLIC BLOOD PRESSURE: 83 MMHG | HEART RATE: 85 BPM | OXYGEN SATURATION: 98 % | TEMPERATURE: 96.9 F | SYSTOLIC BLOOD PRESSURE: 119 MMHG

## 2025-01-09 DIAGNOSIS — R73.9 ELEVATED BLOOD SUGAR: ICD-10-CM

## 2025-01-09 DIAGNOSIS — I10 ESSENTIAL (PRIMARY) HYPERTENSION: ICD-10-CM

## 2025-01-09 DIAGNOSIS — E55.9 VITAMIN D DEFICIENCY: ICD-10-CM

## 2025-01-09 DIAGNOSIS — I10 ESSENTIAL HYPERTENSION: ICD-10-CM

## 2025-01-09 DIAGNOSIS — Z12.31 ENCOUNTER FOR SCREENING MAMMOGRAM FOR MALIGNANT NEOPLASM OF BREAST: ICD-10-CM

## 2025-01-09 DIAGNOSIS — E78.00 PURE HYPERCHOLESTEROLEMIA: ICD-10-CM

## 2025-01-09 DIAGNOSIS — Z00.00 ROUTINE GENERAL MEDICAL EXAMINATION AT A HEALTH CARE FACILITY: Primary | ICD-10-CM

## 2025-01-09 LAB — POC HEMOGLOBIN A1C: 5.2 % (ref 4.2–6.5)

## 2025-01-09 PROCEDURE — 3079F DIAST BP 80-89 MM HG: CPT | Performed by: NURSE PRACTITIONER

## 2025-01-09 PROCEDURE — 3008F BODY MASS INDEX DOCD: CPT | Performed by: NURSE PRACTITIONER

## 2025-01-09 PROCEDURE — 83036 HEMOGLOBIN GLYCOSYLATED A1C: CPT | Performed by: NURSE PRACTITIONER

## 2025-01-09 PROCEDURE — 3074F SYST BP LT 130 MM HG: CPT | Performed by: NURSE PRACTITIONER

## 2025-01-09 PROCEDURE — 1036F TOBACCO NON-USER: CPT | Performed by: NURSE PRACTITIONER

## 2025-01-09 PROCEDURE — 99396 PREV VISIT EST AGE 40-64: CPT | Performed by: NURSE PRACTITIONER

## 2025-01-09 RX ORDER — TRIAMTERENE/HYDROCHLOROTHIAZID 37.5-25 MG
1.5 TABLET ORAL DAILY
Qty: 135 TABLET | Refills: 4 | Status: SHIPPED | OUTPATIENT
Start: 2025-01-09

## 2025-01-09 ASSESSMENT — ENCOUNTER SYMPTOMS
PALPITATIONS: 0
TROUBLE SWALLOWING: 0
SORE THROAT: 0
POLYPHAGIA: 0
CONFUSION: 0
BACK PAIN: 0
ABDOMINAL PAIN: 0
BRUISES/BLEEDS EASILY: 0
APPETITE CHANGE: 0
EYE REDNESS: 0
FEVER: 0
SHORTNESS OF BREATH: 0
WOUND: 0
HEADACHES: 0
DYSURIA: 0
VOMITING: 0
ADENOPATHY: 0
EYE PAIN: 0
POLYDIPSIA: 0
UNEXPECTED WEIGHT CHANGE: 0
CHILLS: 0
NECK PAIN: 0
FATIGUE: 0
COUGH: 0
DIZZINESS: 0
HALLUCINATIONS: 0
BLOOD IN STOOL: 0
FREQUENCY: 0
EYE DISCHARGE: 0
HEMATURIA: 0

## 2025-01-09 ASSESSMENT — PATIENT HEALTH QUESTIONNAIRE - PHQ9
SUM OF ALL RESPONSES TO PHQ9 QUESTIONS 1 AND 2: 0
2. FEELING DOWN, DEPRESSED OR HOPELESS: NOT AT ALL
1. LITTLE INTEREST OR PLEASURE IN DOING THINGS: NOT AT ALL

## 2025-01-09 NOTE — PATIENT INSTRUCTIONS
Keep haley rowan appt  See dentist    Set up mammogram  Set up ct cardiac score    A1C today=5.2  This is the 3 month average of your sugars.  You are in the normal range which is 5.6 or less.      Handouts given to pt:  physical handout        Labs- No appt needed:    You can use the lab in our building when fasting. The hrs are: Mon-Fri 7a-330p.  No Saturday hrs. Bring the paper order.   OR   [facility] Mon-Fri 7a-12p. No Saturday hrs. Bring the paper order.  OR   [facility] Mon-Fri 630a-530p or Sat 6:30a-12p. Bring the paper order  OR  [facility] Mon-FrI 7a-5p, Sat 8a-12p  [facility] Mon-Fri 730a-4p, Sat  8a-12p  [facility] [address] Mon-Thurs 630a-6p , Fri 630a-4p, Sat 8a-12p  [facility] [address] Mon-Fri 7a-630p and Sat. 7a-3p    Fasting is no food, drink, gum or mints other than water for 12 hrs.   Results will be back in 2-3 business days for most labs. It is always recommended for any orders (labs, xrays, ultrasounds,MRI, ct scan, procedures etc) to check with your insurance provider for expected costs or expenses to you.         You will get your results via phone from my medical assistant if you do not have MyChart.  OR  You will get your results via [employer]    If a result is urgent, I will call to speak to you.    Vaccines:  ---- flu vaccine-declines    ---- Tdap-declines        General recommendations:  Exercise-cardio 4-5d/wk 30min each day  Diet-Breakfast-toast (my favorite Yany Mcfadden Delighful Multigrain or Joe's Killer Bread Good Seed thin-sliced)/bagel/English muffin-whole wheat flour as a 1st ingredient or cereal/oatmeal/granola bar-fiber 4g or more or protein like eggs or peanut butter; optional veggies  Lunch-protein, 1/2c carb or 2 slices bread, veg 1c  Dinner-protein, fist sized carb, veg 1c  Fruit 2 a day  Dairy 2 a day-milk, soy milk, almond milk, cheese, yogurt, cottage cheese  Snacks-Protein-hard boiled egg, nuts  (walnuts/almonds/pecans/pistachios 1/4c), hummus, beef/deer jerky or meat sticks; vegetable, fruit, dairy-milk(1%, skim, almond, soy)/cheese (not a lot of cheddar)/yogurt (Greek is best-my favorite Dannon Fruit on the Bottom Greek)/cottage cheese 2%; triscuits/ popcorn/wheat thins have a lot of fiber; follow serving size on bag/box/container  increase water  Limit alcohol to 1 drink per day for women and 2 drinks per day for men (1 drink=12oz beer or 5oz wine or 1 1/2oz liquor)  Calcium: 500mg 1 twice a day if age 50 and younger and 600mg 1 twice a day if over age 50 (calcium citrate can be taken without food)  Vitamin D: 800-5000 IU/day  Limit salt to <2300mg a day if age 50 and under and <1500mg a day if over age 50/have high bp or diabetes or kidney disease  Recommend folate for childbearing age women 0.4mg per day (can be found in a multivitamin)  Recommend 18mg/dL of iron a day if age 50 and under and 8mg/dL a day if over age 50; take on an empty stomach at bedtime  Use sunscreen   Wear seatbelt  Recommend safe sex practices: using condoms everytime you have sex, discuss with a new partner about their past partners/history of STDs/drug use, avoid drinking alcohol or using drugs as this increases the chance that you will participate in high-risk sex, for oral sex help protect your mouth by having your partner use a condom (male or female), women should not douche after sex, be aware of your partner's body and your body-look for signs of a sore, blister, rash, or discharge, and have regular exams and periodic tests for STDs.  No distracted driving  No driving when under influence of substances  Wear a seatbelt  Eye dr every 1-2yrs  Dentist every 6-12 mon  Tetanus shot every 10yrs  Recommend flu vaccine in the fall  Appt in  1 year for physical      I will communicate with you via [employer] regarding messages and results. If you need help with this, you can call the support line at [phone].    IT WAS A PLEASURE  TO SEE YOU TODAY. THANK YOU FOR CHOOSING US FOR YOUR HEALTHCARE NEEDS.

## 2025-01-09 NOTE — PROGRESS NOTES
Subjective   Patient ID: Thu Antunez is a 44 y.o. female who presents for Annual Exam.      Is pt fasting?  No   Does pt see any providers other than care team below:   rah Worthington mahajan      Any forms to fill out? No   Does pt want flu shot? No   Does pt want a tetanus shot? No   Did pt set up mammogram? No, but needs new copy.   Did pt set up ct cardiac score? No   Bps at home-  120/80  Did pt set up appt with gi dr? Today at 4:30   Does the pt take any otc vit d? If yes how many international units?  Does not do   Any other questions or concerns that pt wants to discuss today? no    Poc A1c=5.2    No care team member to display    HPI  Last labs-12/2024 2 liver enzymes high. You have known fatty liver.  Sugar (aka glucose), kidney function,  and electrolytes in the CMP (comprehensive metabolic panel) were normal.  Lipase high. Amylase normal. This can be a sign of intestinal issue, pancreas issue or gallbadder issue. Let's have you repeat the lipase lab on Monday to see if going up or down. No fasting or appt needed.  Go to ER if symptoms worsen especially worsening pain.  You do need to see whichever GI dr. Pls call to make an appt with them.  CBC (complete blood count) was normal which looks at infection and anemia markers.  7/2024 Trigs and hdl normal.  Ldl high at 138. Goal <100. Decr fats and incr fiber.  Sugar (aka glucose), kidney function, liver function and electrolytes in the CMP (comprehensive metabolic panel) were normal.  TSH (thyroid test) was normal.  CBC (complete blood count) was normal which looks at infection and anemia markers.  1/2024 A1c 5.3  4/2023 Hdl normal.  Ldl high at 134. It did come down form 142. Goal <100. Decr fats and incr fiber.  Trigs high at 258. This went up from  140. Goal <150. Decr carbs and sugars. Start fish oil 1 twice a day. Recheck in 2mon  kidney function, liver function and electrolytes in the CMP (comprehensive metabolic panel) were normal.  Sugar high  at 103. Goal <100. Decr carbs and sugars. Last time it was 90.  10/2022 tsh nl, vit d nl, ldl 142, 2 liver enz high; 4/2022 cbc nl   Due for labs- lipase-print, tsh, lipid, fbs    Cholesterol   Date Value Ref Range Status   10/04/2022 256 (H) 0 - 199 mg/dL Final     Comment:     .      AGE      DESIRABLE   BORDERLINE HIGH   HIGH     0-19 Y     0 - 169       170 - 199     >/= 200    20-24 Y     0 - 189       190 - 224     >/= 225         >24 Y     0 - 199       200 - 239     >/= 240   **All ranges are based on fasting samples. Specific   therapeutic targets will vary based on patient-specific   cardiac risk.  .   Pediatric guidelines reference:Pediatrics 2011, 128(S5).   Adult guidelines reference: NCEP ATPIII Guidelines,     RENATA 2001, 258:2486-97  .   Venipuncture immediately after or during the    administration of Metamizole may lead to falsely   low results. Testing should be performed immediately   prior to Metamizole dosing.     04/05/2022 255 (H) 0 - 199 mg/dL Final     Comment:     .      AGE      DESIRABLE   BORDERLINE HIGH   HIGH     0-19 Y     0 - 169       170 - 199     >/= 200    20-24 Y     0 - 189       190 - 224     >/= 225         >24 Y     0 - 199       200 - 239     >/= 240   **All ranges are based on fasting samples. Specific   therapeutic targets will vary based on patient-specific   cardiac risk.  .   Pediatric guidelines reference:Pediatrics 2011, 128(S5).   Adult guidelines reference: NCEP ATPIII Guidelines,     RENATA 2001, 258:2486-97  .   Venipuncture immediately after or during the    administration of Metamizole may lead to falsely   low results. Testing should be performed immediately   prior to Metamizole dosing.     02/18/2020 235 (H) 0 - 199 mg/dL Final     Comment:     .      AGE      DESIRABLE   BORDERLINE HIGH   HIGH     0-19 Y     0 - 169       170 - 199     >/= 200    20-24 Y     0 - 189       190 - 224     >/= 225         >24 Y     0 - 199       200 - 239     >/= 240   **All  ranges are based on fasting samples. Specific   therapeutic targets will vary based on patient-specific   cardiac risk.  .   Pediatric guidelines reference:Pediatrics 2011, 128(S5).   Adult guidelines reference: NCEP ATPIII Guidelines,     RENATA 2001, 258:2486-97  .   Venipuncture immediately after or during the    administration of Metamizole may lead to falsely   low results. Testing should be performed immediately   prior to Metamizole dosing.       Triglycerides   Date Value Ref Range Status   10/04/2022 140 0 - 149 mg/dL Final     Comment:     .      AGE      DESIRABLE   BORDERLINE HIGH   HIGH     VERY HIGH   0 D-90 D    19 - 174         ----         ----        ----  91 D- 9 Y     0 -  74        75 -  99     >/= 100      ----    10-19 Y     0 -  89        90 - 129     >/= 130      ----    20-24 Y     0 - 114       115 - 149     >/= 150      ----         >24 Y     0 - 149       150 - 199    200- 499    >/= 500  .   Venipuncture immediately after or during the    administration of Metamizole may lead to falsely   low results. Testing should be performed immediately   prior to Metamizole dosing.     04/05/2022 112 0 - 149 mg/dL Final     Comment:     .      AGE      DESIRABLE   BORDERLINE HIGH   HIGH     VERY HIGH   0 D-90 D    19 - 174         ----         ----        ----  91 D- 9 Y     0 -  74        75 -  99     >/= 100      ----    10-19 Y     0 -  89        90 - 129     >/= 130      ----    20-24 Y     0 - 114       115 - 149     >/= 150      ----         >24 Y     0 - 149       150 - 199    200- 499    >/= 500  .   Venipuncture immediately after or during the    administration of Metamizole may lead to falsely   low results. Testing should be performed immediately   prior to Metamizole dosing.     02/18/2020 136 0 - 149 mg/dL Final     Comment:     .      AGE      DESIRABLE   BORDERLINE HIGH   HIGH     VERY HIGH   0 D-90 D    19 - 174         ----         ----        ----  91 D- 9 Y     0 -  74        75 -   "99     >/= 100      ----    10-19 Y     0 -  89        90 - 129     >/= 130      ----    20-24 Y     0 - 114       115 - 149     >/= 150      ----         >24 Y     0 - 149       150 - 199    200- 499    >/= 500  .   Venipuncture immediately after or during the    administration of Metamizole may lead to falsely   low results. Testing should be performed immediately   prior to Metamizole dosing.       HDL   Date Value Ref Range Status   10/04/2022 86.0 mg/dL Final     Comment:     .      AGE      VERY LOW   LOW     NORMAL    HIGH       0-19 Y       < 35   < 40     40-45     ----    20-24 Y       ----   < 40       >45     ----      >24 Y       ----   < 40     40-60      >60  .     04/05/2022 103.4 mg/dL Final     Comment:     .      AGE      VERY LOW   LOW     NORMAL    HIGH       0-19 Y       < 35   < 40     40-45     ----    20-24 Y       ----   < 40       >45     ----      >24 Y       ----   < 40     40-60      >60  .     02/18/2020 111.1 mg/dL Final     Comment:     .      AGE      VERY LOW   LOW     NORMAL    HIGH       0-19 Y       < 35   < 40     40-45     ----    20-24 Y       ----   < 40       >45     ----      >24 Y       ----   < 40     40-60      >60  .       No results found for: \"LDL\"  TSH   Date Value Ref Range Status   10/04/2022 1.63 0.44 - 3.98 mIU/L Final     Comment:      TSH testing is performed using different testing    methodology at [facility] than at other    [facility] hospitals. Direct result comparisons should    only be made within the same method.       No results found for: \"A1C\"  No components found for: \"POCA1C\"  No results found for: \"ALBUR\"  No components found for: \"POCALBUR\"      Other concerns: none    ER/urgicare visits in the last year- none  Hospitalizations in the last year- none      last Pap-hyster    FH ovarian, endometrial, cervical, uterine ca-none    last mammo- (40-75/90)-due  Self breast exams-yes    FH br ca-none    Last colon 8/5/22; due 8/2027 dr clement " jas   colon ca-none      Exercise- walking; wants to get on a plan with   Diet-2 meals a day   Body mass index is 27.69 kg/m².    lasik    last dental appt- due    BMs-regular  Sleep-able to fall asleep and stay asleep; no snoring or apnea  no cp, swelling, sob, abd pain, n/v/d/c, blood in stool or black stools  STI testing including hiv (age 15-65) and hep c screening (18-79)-declines        Immunization History   Administered Date(s) Administered    Pfizer Purple Cap SARS-CoV-2 04/20/2021, 05/18/2021     Flu shot-declines  Tdap-declines      fractures in lifetime-none  Anyone with osteoporosis in the family-mom-on the verge     heart attack, heart surgery-dad-mi, mgf   stroke-none  Dad dm    The 10-year ASCVD risk score (Debra CHRISTIANSEN, et al., 2019) is: 0.6%    Values used to calculate the score:      Age: 44 years      Sex: Female      Is Non- : No      Diabetic: No      Tobacco smoker: No      Systolic Blood Pressure: 119 mmHg      Is BP treated: Yes      HDL Cholesterol: 86 mg/dL      Total Cholesterol: 256 mg/dL  Coronary Artery Calcium score: has orders      Review of Systems   Constitutional:  Negative for appetite change, chills, fatigue, fever and unexpected weight change.   HENT:  Negative for congestion, ear pain, sore throat and trouble swallowing.    Eyes:  Negative for pain, discharge and redness.   Respiratory:  Negative for cough and shortness of breath.    Cardiovascular:  Negative for chest pain and palpitations.   Gastrointestinal:  Negative for abdominal pain, blood in stool and vomiting.   Endocrine: Negative for polydipsia, polyphagia and polyuria.   Genitourinary:  Negative for dysuria, frequency, hematuria and urgency.   Musculoskeletal:  Negative for back pain and neck pain.   Skin:  Negative for rash and wound.   Allergic/Immunologic: Negative for immunocompromised state.   Neurological:  Negative for dizziness, syncope and headaches.   Hematological:   Negative for adenopathy. Does not bruise/bleed easily.   Psychiatric/Behavioral:  Negative for confusion and hallucinations.        Objective   Visit Vitals  /83   Pulse 85   Temp 36.1 °C (96.9 °F)        BP Readings from Last 3 Encounters:   01/09/25 119/83   12/27/24 121/83   01/04/24 126/88     Wt Readings from Last 3 Encounters:   01/09/25 68.7 kg (151 lb 6.4 oz)   12/27/24 68 kg (150 lb)   01/04/24 75.4 kg (166 lb 3.2 oz)           Physical Exam  Constitutional:       General: She is not in acute distress.     Appearance: Normal appearance. She is not ill-appearing.   HENT:      Head: Normocephalic.      Right Ear: Tympanic membrane, ear canal and external ear normal.      Left Ear: Tympanic membrane, ear canal and external ear normal.      Nose: Nose normal.      Mouth/Throat:      Mouth: Mucous membranes are moist.      Pharynx: Oropharynx is clear.   Eyes:      Extraocular Movements: Extraocular movements intact.      Conjunctiva/sclera: Conjunctivae normal.      Pupils: Pupils are equal, round, and reactive to light.   Cardiovascular:      Rate and Rhythm: Normal rate and regular rhythm.      Heart sounds: Normal heart sounds. No murmur heard.  Pulmonary:      Effort: Pulmonary effort is normal. No respiratory distress.      Breath sounds: Normal breath sounds. No wheezing, rhonchi or rales.   Abdominal:      General: Bowel sounds are normal.      Palpations: Abdomen is soft. There is no mass.      Tenderness: There is no abdominal tenderness.   Musculoskeletal:         General: No swelling or tenderness. Normal range of motion.      Cervical back: Normal range of motion and neck supple.      Right lower leg: No edema.      Left lower leg: No edema.   Skin:     General: Skin is warm.      Findings: No rash.   Neurological:      General: No focal deficit present.      Mental Status: She is alert and oriented to person, place, and time.      Cranial Nerves: No cranial nerve deficit.      Motor: No  weakness.   Psychiatric:         Mood and Affect: Mood normal.         Behavior: Behavior normal.         Assessment/Plan   Diagnoses and all orders for this visit:  Routine general medical examination at a health care facility  -     Lipid Panel; Future  -     TSH with reflex to Free T4 if abnormal; Future  -     Glucose, fasting; Future  Elevated blood sugar  -     POCT glycosylated hemoglobin (Hb A1C) manually resulted  Essential hypertension  Encounter for screening mammogram for malignant neoplasm of breast  -     BI mammo bilateral screening tomosynthesis; Future  Pure hypercholesterolemia  -     CT cardiac scoring wo IV contrast; Future  Vitamin D deficiency  -     Vitamin D 25-Hydroxy,Total (for eval of Vitamin D levels); Future  BMI 27.0-27.9,adult  Other orders  -     Follow Up In Primary Care - Health Maintenance; Future

## 2025-01-10 LAB
NON-UH HIE A/G RATIO: 1.4
NON-UH HIE ALB: 4.2 G/DL (ref 3.4–5)
NON-UH HIE ALK PHOS: 80 UNIT/L (ref 45–117)
NON-UH HIE AMYLASE: 49 UNIT/L (ref 30–118)
NON-UH HIE BASO COUNT: 0.03 X1000 (ref 0–0.2)
NON-UH HIE BASOS %: 0.3 %
NON-UH HIE BILIRUBIN, TOTAL: 1.1 MG/DL (ref 0.3–1.2)
NON-UH HIE BUN/CREAT RATIO: 12
NON-UH HIE BUN: 12 MG/DL (ref 9–23)
NON-UH HIE CALCIUM: 10.1 MG/DL (ref 8.7–10.4)
NON-UH HIE CALCULATED LDL CHOLESTEROL: 125 MG/DL (ref 60–130)
NON-UH HIE CALCULATED OSMOLALITY: 275 MOSM/KG (ref 275–295)
NON-UH HIE CHLORIDE: 103 MMOL/L (ref 98–107)
NON-UH HIE CHOLESTEROL: 242 MG/DL (ref 100–200)
NON-UH HIE CO2, VENOUS: 27 MMOL/L (ref 20–31)
NON-UH HIE CREATININE: 1 MG/DL (ref 0.5–0.8)
NON-UH HIE DIFF?: NO
NON-UH HIE EOS COUNT: 0.04 X1000 (ref 0–0.5)
NON-UH HIE EOSIN %: 0.5 %
NON-UH HIE FREE T4: 1.33 NG/DL (ref 0.89–1.76)
NON-UH HIE GAMMA-GT: 115 UNIT/L (ref 0–38)
NON-UH HIE GFR AA: >60
NON-UH HIE GLOBULIN: 3.1 G/DL
NON-UH HIE GLOMERULAR FILTRATION RATE: 60 ML/MIN/1.73M?
NON-UH HIE GLUCOSE: 89 MG/DL (ref 74–106)
NON-UH HIE GLUCOSE: 90 MG/DL (ref 74–106)
NON-UH HIE GOT: 23 UNIT/L (ref 15–37)
NON-UH HIE GPT: 30 UNIT/L (ref 10–49)
NON-UH HIE HCT: 42.4 % (ref 36–46)
NON-UH HIE HDL CHOLESTEROL: 94 MG/DL (ref 40–60)
NON-UH HIE HGB: 14.6 G/DL (ref 12–16)
NON-UH HIE INSTR WBC: 8.2
NON-UH HIE K: 3.8 MMOL/L (ref 3.5–5.1)
NON-UH HIE LIPASE: 33 UNIT/L (ref 12–53)
NON-UH HIE LYMPH %: 23.8 %
NON-UH HIE LYMPH COUNT: 1.95 X1000 (ref 1.2–4.8)
NON-UH HIE MAGNESIUM: 1.8 MG/DL (ref 1.6–2.6)
NON-UH HIE MCH: 33.7 PG (ref 27–34)
NON-UH HIE MCHC: 34.5 G/DL (ref 32–37)
NON-UH HIE MCV: 97.8 FL (ref 80–100)
NON-UH HIE MONO %: 8.5 %
NON-UH HIE MONO COUNT: 0.7 X1000 (ref 0.1–1)
NON-UH HIE MPV: 8.2 FL (ref 7.4–10.4)
NON-UH HIE NA: 138 MMOL/L (ref 135–145)
NON-UH HIE NEUTROPHIL %: 66.8 %
NON-UH HIE NEUTROPHIL COUNT (ANC): 5.47 X1000 (ref 1.4–8.8)
NON-UH HIE NUCLEATED RBC: 0 /100WBC
NON-UH HIE PLATELET: 306 X10 (ref 150–450)
NON-UH HIE RBC: 4.34 X10 (ref 4.2–5.4)
NON-UH HIE RDW: 13.3 % (ref 11.5–14.5)
NON-UH HIE TOTAL CHOL/HDL CHOL RATIO: 2.6
NON-UH HIE TOTAL PROTEIN: 7.3 G/DL (ref 5.7–8.2)
NON-UH HIE TRIGLYCERIDES: 116 MG/DL (ref 30–150)
NON-UH HIE TSH: 0.88 UIU/ML (ref 0.55–4.78)
NON-UH HIE TSH: 0.91 UIU/ML (ref 0.55–4.78)
NON-UH HIE VIT D 25: 17 NG/ML
NON-UH HIE WBC: 8.2 X10 (ref 4.5–11)

## 2025-01-18 DIAGNOSIS — R10.10 UPPER ABDOMINAL PAIN: ICD-10-CM

## 2025-01-20 RX ORDER — SUCRALFATE 1 G/1
1 TABLET ORAL
Qty: 360 TABLET | Refills: 4 | Status: SHIPPED | OUTPATIENT
Start: 2025-01-20 | End: 2026-01-20

## 2025-01-20 RX ORDER — DICYCLOMINE HYDROCHLORIDE 20 MG/1
20 TABLET ORAL 4 TIMES DAILY PRN
Qty: 360 TABLET | Refills: 4 | Status: SHIPPED | OUTPATIENT
Start: 2025-01-20 | End: 2026-04-15

## 2025-02-06 ENCOUNTER — PATIENT MESSAGE (OUTPATIENT)
Dept: PRIMARY CARE | Facility: CLINIC | Age: 45
End: 2025-02-06
Payer: COMMERCIAL

## 2025-02-06 DIAGNOSIS — R10.10 UPPER ABDOMINAL PAIN: ICD-10-CM

## 2025-02-06 DIAGNOSIS — R11.2 NAUSEA AND VOMITING, UNSPECIFIED VOMITING TYPE: Primary | ICD-10-CM

## 2025-02-06 DIAGNOSIS — I10 ESSENTIAL (PRIMARY) HYPERTENSION: ICD-10-CM

## 2025-02-06 RX ORDER — OXYCODONE AND ACETAMINOPHEN 5; 325 MG/1; MG/1
1 TABLET ORAL EVERY 6 HOURS PRN
Qty: 8 TABLET | Refills: 0 | Status: SHIPPED | OUTPATIENT
Start: 2025-02-06 | End: 2025-02-08

## 2025-02-06 RX ORDER — ONDANSETRON 4 MG/1
4 TABLET, FILM COATED ORAL EVERY 8 HOURS PRN
Qty: 30 TABLET | Refills: 0 | Status: SHIPPED | OUTPATIENT
Start: 2025-02-06 | End: 2025-02-16

## 2025-02-06 RX ORDER — LOSARTAN POTASSIUM 25 MG/1
25 TABLET ORAL DAILY
Qty: 90 TABLET | Refills: 3 | Status: SHIPPED | OUTPATIENT
Start: 2025-02-06

## 2025-02-10 ENCOUNTER — PATIENT OUTREACH (OUTPATIENT)
Dept: PRIMARY CARE | Facility: CLINIC | Age: 45
End: 2025-02-10
Payer: COMMERCIAL

## 2025-02-10 RX ORDER — AMLODIPINE BESYLATE 5 MG/1
5 TABLET ORAL DAILY
COMMUNITY

## 2025-02-10 NOTE — PROGRESS NOTES
Discharge Facility:[facility]   Discharge Diagnosis:  Epigastric discomfort   Recurrent biliary colic   Hypokalemia   Transaminitis  Acute renal failure   Dehydration   Benign essential hypertension   Obesity     Admission Date:2/6/2025  Discharge Date: 2/9/2025    PCP Appointment Date:Declined   Specialist Appointment Date:   2/17/2025 LUIS ANTONIO/Gena  Nephrology/Sylvia Will do follow up labs this week.     Hospital Encounter and Summary Linked: Yes  See discharge assessment below for further details  Wrap Up  Wrap Up Additional Comments: -- (Thu is feeling better. She is taking Amlodipine as directed and will monitor BP.  She did mention facial swelling, they changed some of her meds around, so will discuss with specialists. She will monitor her weight daily and record.) (2/10/2025  6:20 PM)    Engagement  Call Start Time: 1459 (2/10/2025  6:20 PM)    Medications  Medications reviewed with patient/caregiver?: Yes (2/10/2025  6:20 PM)  Is the patient having any side effects they believe may be caused by any medication additions or changes?: No (2/10/2025  6:20 PM)  Does the patient have all medications ordered at discharge?: Yes (2/10/2025  6:20 PM)  Care Management Interventions: No intervention needed (2/10/2025  6:20 PM)  Prescription Comments: -- (Amlodipine 5 mg qd) (2/10/2025  6:20 PM)  Is the patient taking all medications as directed (includes completed medication regime)?: Yes (2/10/2025  6:20 PM)  Medication Comments: -- (Thu verbalized understanding of medication) (2/10/2025  6:20 PM)    Appointments  Does the patient have a primary care provider?: Yes (2/10/2025  6:20 PM)  Care Management Interventions: -- (Declined PCP follow up at this time, She will follow up with Nephrology and GI) (2/10/2025  6:20 PM)  Has the patient kept scheduled appointments due by today?: Yes (2/10/2025  6:20 PM)    Self Management  What is the home health agency?: -- (N/A) (2/10/2025  6:20 PM)  What Durable Medical Equipment  (DME) was ordered?: -- (N/A) (2/10/2025  6:20 PM)    Patient Teaching  Does the patient have access to their discharge instructions?: Yes (2/10/2025  6:20 PM)  What is the patient's perception of their health status since discharge?: Improving (2/10/2025  6:20 PM)  Is the patient/caregiver able to teach back the hierarchy of who to call/visit for symptoms/problems? PCP, Specialist, Home Health nurse, Urgent Care, ED, 911: Yes (2/10/2025  6:20 PM)

## 2025-02-12 LAB
NON-UH HIE ALB: 3.7 G/DL (ref 3.4–5)
NON-UH HIE ALK PHOS: 110 UNIT/L (ref 45–117)
NON-UH HIE BILIRUBIN, DIRECT: 0.2 MG/DL (ref 0–0.4)
NON-UH HIE BILIRUBIN, TOTAL: 0.5 MG/DL (ref 0.3–1.2)
NON-UH HIE BUN/CREAT RATIO: 8.3
NON-UH HIE BUN: 20 MG/DL (ref 9–23)
NON-UH HIE CALCIUM: 9.6 MG/DL (ref 8.7–10.4)
NON-UH HIE CALCULATED OSMOLALITY: 282 MOSM/KG (ref 275–295)
NON-UH HIE CHLORIDE: 102 MMOL/L (ref 98–107)
NON-UH HIE CO2, VENOUS: 27 MMOL/L (ref 20–31)
NON-UH HIE CREATININE: 2.4 MG/DL (ref 0.5–0.8)
NON-UH HIE GFR AA: 26
NON-UH HIE GLOMERULAR FILTRATION RATE: 22 ML/MIN/1.73M?
NON-UH HIE GLUCOSE: 98 MG/DL (ref 74–106)
NON-UH HIE GOT: 22 UNIT/L (ref 15–37)
NON-UH HIE GPT: 65 UNIT/L (ref 10–49)
NON-UH HIE K: 3.4 MMOL/L (ref 3.5–5.1)
NON-UH HIE NA: 140 MMOL/L (ref 135–145)
NON-UH HIE TOTAL PROTEIN: 6.8 G/DL (ref 5.7–8.2)

## 2025-02-19 LAB
NON-UH HIE A/G RATIO: 1.3
NON-UH HIE ALB: 4 G/DL (ref 3.4–5)
NON-UH HIE ALK PHOS: 81 UNIT/L (ref 45–117)
NON-UH HIE BASO COUNT: 0.04 X1000 (ref 0–0.2)
NON-UH HIE BASOS %: 0.6 %
NON-UH HIE BILIRUBIN, TOTAL: 0.6 MG/DL (ref 0.3–1.2)
NON-UH HIE BUN/CREAT RATIO: 12.7
NON-UH HIE BUN: 19 MG/DL (ref 9–23)
NON-UH HIE CALCIUM: 10.1 MG/DL (ref 8.7–10.4)
NON-UH HIE CALCULATED OSMOLALITY: 281 MOSM/KG (ref 275–295)
NON-UH HIE CHLORIDE: 102 MMOL/L (ref 98–107)
NON-UH HIE CO2, VENOUS: 25 MMOL/L (ref 20–31)
NON-UH HIE CREATININE: 1.5 MG/DL (ref 0.5–0.8)
NON-UH HIE DIFF?: ABNORMAL
NON-UH HIE EOS COUNT: 0.1 X1000 (ref 0–0.5)
NON-UH HIE EOSIN %: 1.4 %
NON-UH HIE GAMMA-GT: 222 UNIT/L (ref 0–38)
NON-UH HIE GFR AA: 45
NON-UH HIE GLOBULIN: 3.2 G/DL
NON-UH HIE GLOMERULAR FILTRATION RATE: 38 ML/MIN/1.73M?
NON-UH HIE GLUCOSE: 89 MG/DL (ref 74–106)
NON-UH HIE GOT: 22 UNIT/L (ref 15–37)
NON-UH HIE GPT: 27 UNIT/L (ref 10–49)
NON-UH HIE HCT: 37.7 % (ref 36–46)
NON-UH HIE HGB: 12.9 G/DL (ref 12–16)
NON-UH HIE INSTR WBC: 7.1
NON-UH HIE K: 3.7 MMOL/L (ref 3.5–5.1)
NON-UH HIE LYMPH %: 33.2 %
NON-UH HIE LYMPH COUNT: 2.36 X1000 (ref 1.2–4.8)
NON-UH HIE MCH: 32.7 PG (ref 27–34)
NON-UH HIE MCHC: 34.1 G/DL (ref 32–37)
NON-UH HIE MCV: 95.8 FL (ref 80–100)
NON-UH HIE MONO %: 10.3 %
NON-UH HIE MONO COUNT: 0.73 X1000 (ref 0.1–1)
NON-UH HIE MPV: 7 FL (ref 7.4–10.4)
NON-UH HIE NA: 140 MMOL/L (ref 135–145)
NON-UH HIE NEUTROPHIL %: 54.5 %
NON-UH HIE NEUTROPHIL COUNT (ANC): 3.87 X1000 (ref 1.4–8.8)
NON-UH HIE NUCLEATED RBC: 0 /100WBC
NON-UH HIE PLATELET: 476 X10 (ref 150–450)
NON-UH HIE RBC: 3.94 X10 (ref 4.2–5.4)
NON-UH HIE RDW: 11.8 % (ref 11.5–14.5)
NON-UH HIE TOTAL PROTEIN: 7.2 G/DL (ref 5.7–8.2)
NON-UH HIE WBC: 7.1 X10 (ref 4.5–11)

## 2025-02-23 DIAGNOSIS — R73.9 ELEVATED BLOOD SUGAR: ICD-10-CM

## 2025-02-24 RX ORDER — SEMAGLUTIDE 2.68 MG/ML
2 INJECTION, SOLUTION SUBCUTANEOUS
Qty: 3 ML | Refills: 11 | Status: SHIPPED | OUTPATIENT
Start: 2025-03-02

## 2025-02-26 ENCOUNTER — PATIENT OUTREACH (OUTPATIENT)
Dept: PRIMARY CARE | Facility: CLINIC | Age: 45
End: 2025-02-26
Payer: COMMERCIAL

## 2025-03-19 LAB
Lab: 56 MMOL/L
NON-UH HIE ALB: 4.2 G/DL (ref 3.4–5)
NON-UH HIE BUN/CREAT RATIO: 12.2
NON-UH HIE BUN: 11 MG/DL (ref 9–23)
NON-UH HIE CALCIUM: 10.6 MG/DL (ref 8.7–10.4)
NON-UH HIE CALCULATED OSMOLALITY: 276 MOSM/KG (ref 275–295)
NON-UH HIE CHLORIDE: 101 MMOL/L (ref 98–107)
NON-UH HIE CO2, VENOUS: 29 MMOL/L (ref 20–31)
NON-UH HIE CORRECTED CALCIUM: ABNORMAL MG/DL (ref 8.5–10.5)
NON-UH HIE CREATININE RANDOM, U: 15.1 MG/DL
NON-UH HIE CREATININE: 0.9 MG/DL (ref 0.5–0.8)
NON-UH HIE GFR AA: >60
NON-UH HIE GFR ESTIMATED: 68
NON-UH HIE GLOMERULAR FILTRATION RATE: >60 ML/MIN/1.73M?
NON-UH HIE GLUCOSE: 78 MG/DL (ref 74–106)
NON-UH HIE K: 4.2 MMOL/L (ref 3.5–5.1)
NON-UH HIE NA: 139 MMOL/L (ref 135–145)
NON-UH HIE PHOSPHORUS: 4 MG/DL (ref 2–5.1)
NON-UH HIE TOTAL PROTEIN, RANDOM URINE: <6 MG/DL (ref 1–14)
NON-UH HIE URINE TOTAL PROTEIN / CREAT RATIO RANDOM: <0.4 MG/MG

## 2025-03-26 ENCOUNTER — PATIENT OUTREACH (OUTPATIENT)
Dept: PRIMARY CARE | Facility: CLINIC | Age: 45
End: 2025-03-26
Payer: COMMERCIAL

## 2025-04-11 DIAGNOSIS — K21.9 GASTRO-ESOPHAGEAL REFLUX DISEASE WITHOUT ESOPHAGITIS: ICD-10-CM

## 2025-04-13 RX ORDER — PANTOPRAZOLE SODIUM 40 MG/1
TABLET, DELAYED RELEASE ORAL
Qty: 90 TABLET | Refills: 3 | Status: SHIPPED | OUTPATIENT
Start: 2025-04-13

## 2025-05-07 ENCOUNTER — PATIENT OUTREACH (OUTPATIENT)
Dept: PRIMARY CARE | Facility: CLINIC | Age: 45
End: 2025-05-07
Payer: COMMERCIAL

## 2025-06-11 LAB
NON-UH HIE ALB: 4.1 G/DL (ref 3.4–5)
NON-UH HIE BUN/CREAT RATIO: 13
NON-UH HIE BUN: 13 MG/DL (ref 9–23)
NON-UH HIE CALCIUM: 10.1 MG/DL (ref 8.7–10.4)
NON-UH HIE CALCULATED OSMOLALITY: 275 MOSM/KG (ref 275–295)
NON-UH HIE CHLORIDE: 102 MMOL/L (ref 98–107)
NON-UH HIE CO2, VENOUS: 27 MMOL/L (ref 20–31)
NON-UH HIE CORRECTED CALCIUM: ABNORMAL MG/DL (ref 8.5–10.5)
NON-UH HIE CREATININE, URINE MG/DL: 169 MG/DL
NON-UH HIE CREATININE: 1 MG/DL (ref 0.5–0.8)
NON-UH HIE GFR AA: >60
NON-UH HIE GFR ESTIMATED: 60
NON-UH HIE GLOMERULAR FILTRATION RATE: 60 ML/MIN/1.73M?
NON-UH HIE GLUCOSE: 86 MG/DL (ref 74–106)
NON-UH HIE K: 3.5 MMOL/L (ref 3.5–5.1)
NON-UH HIE MICROALBUMIN, URINE MG/L: 5 MG/L
NON-UH HIE MICROALBUMIN/CREATININE RATIO: 3 MG MALB/GM CREAT (ref 0–30)
NON-UH HIE NA: 138 MMOL/L (ref 135–145)
NON-UH HIE PHOSPHORUS: 4.3 MG/DL (ref 2–5.1)

## 2026-01-16 ENCOUNTER — APPOINTMENT (OUTPATIENT)
Dept: PRIMARY CARE | Facility: CLINIC | Age: 46
End: 2026-01-16
Payer: COMMERCIAL